# Patient Record
Sex: FEMALE | Race: WHITE | Employment: OTHER | URBAN - METROPOLITAN AREA
[De-identification: names, ages, dates, MRNs, and addresses within clinical notes are randomized per-mention and may not be internally consistent; named-entity substitution may affect disease eponyms.]

---

## 2017-05-10 ENCOUNTER — OFFICE VISIT (OUTPATIENT)
Dept: RHEUMATOLOGY | Age: 82
End: 2017-05-10

## 2017-05-10 VITALS
HEART RATE: 68 BPM | RESPIRATION RATE: 16 BRPM | TEMPERATURE: 97.8 F | SYSTOLIC BLOOD PRESSURE: 137 MMHG | DIASTOLIC BLOOD PRESSURE: 71 MMHG | OXYGEN SATURATION: 94 %

## 2017-05-10 DIAGNOSIS — E07.9 THYROID DISEASE: ICD-10-CM

## 2017-05-10 DIAGNOSIS — M05.79 SEROPOSITIVE RHEUMATOID ARTHRITIS OF MULTIPLE SITES (HCC): Primary | ICD-10-CM

## 2017-05-10 DIAGNOSIS — M19.90 OSTEOARTH NOS-UNSPEC: ICD-10-CM

## 2017-05-10 RX ORDER — ALPRAZOLAM 0.25 MG/1
TABLET ORAL
COMMUNITY

## 2017-05-10 RX ORDER — TRAMADOL HYDROCHLORIDE AND ACETAMINOPHEN 37.5; 325 MG/1; MG/1
TABLET ORAL
COMMUNITY

## 2017-05-10 RX ORDER — SIMVASTATIN 10 MG/1
TABLET, FILM COATED ORAL
COMMUNITY
End: 2020-01-01

## 2017-05-10 RX ORDER — METOPROLOL TARTRATE 25 MG/1
TABLET, FILM COATED ORAL 2 TIMES DAILY
COMMUNITY

## 2017-05-10 RX ORDER — LEVOTHYROXINE SODIUM 125 UG/1
TABLET ORAL
COMMUNITY
End: 2021-01-01

## 2017-05-10 RX ORDER — WARFARIN SODIUM 5 MG/1
5 TABLET ORAL DAILY
COMMUNITY

## 2017-05-10 RX ORDER — METHOTREXATE 2.5 MG/1
TABLET ORAL
COMMUNITY

## 2017-05-10 RX ORDER — AMLODIPINE BESYLATE 10 MG/1
TABLET ORAL DAILY
COMMUNITY

## 2017-05-10 RX ORDER — ACETAMINOPHEN 500 MG
TABLET ORAL
COMMUNITY

## 2017-05-10 RX ORDER — LOSARTAN POTASSIUM 25 MG/1
TABLET ORAL DAILY
COMMUNITY

## 2017-05-10 RX ORDER — PANTOPRAZOLE SODIUM 40 MG/1
40 TABLET, DELAYED RELEASE ORAL DAILY
COMMUNITY

## 2017-05-10 RX ORDER — CHOLECALCIFEROL (VITAMIN D3) 125 MCG
CAPSULE ORAL
COMMUNITY

## 2017-05-10 RX ORDER — TRAMADOL HYDROCHLORIDE 50 MG/1
50 TABLET ORAL
COMMUNITY

## 2017-05-10 RX ORDER — CITALOPRAM 20 MG/1
TABLET, FILM COATED ORAL DAILY
COMMUNITY
End: 2021-01-01

## 2017-05-10 NOTE — PROGRESS NOTES
CHIEF COMPLAINT  The patient was sent for rheumatology consultation by Dr. Pam Tam MD for evaluation of joint pain. HISTORY OF PRESENT ILLNESS  This is a 80 y.o.  female. Today, the patient complains of pain in the joints. Location: knee, spine  Severity:  5 on a scale of 0-10  Timing: daily  Duration:  many years  Modifying factors: Tramadol  Context/Associated signs and symptoms: The patient states that she was diagnosed with RA about 15-20 years ago. When does not remember her symptoms when initially diagnosed. She states that she has been on methotrexate for many years and is currently taking 15 mg weekly. She admits to missing it on occasion, but never more than one week at a time. She denies taking any other treatments for her RA. She states that now she has morning stiffness in her knees and back that lasts for less than an hour. However, she notes that her back pain and stiffness improves with movement throughout the day. She denies any persistent joint swelling and she denies any morning stiffness in her hands, but she complains of triggering in her right 4th finger on occasion. She admits to taking tramadol in the mornings and states that her pain improves. She reports having steroid injections in her back in the past. Her son reports that she frequently gets UTIs. Her son reports that she has fallen while on tramadol and states that she is doing physical therapy now.      RHEUMATOLOGY REVIEW OF SYSTEMS   Positives as per HPI  Negatives as follows:  Merry Small:  Denies unexplained persistent fevers, weight change, chronic fatigue  HEAD/EYES:   Denies eye redness, blurry vision or sudden loss of vision, dry eyes, HA, temporal artery pain  ENT:    Denies oral/nasal ulcers, recurrent sinus infections, dry mouth  RESPIRATORY:  No pleuritic pain, history of pleural effusions, hemoptysis, exertional dyspnea  CARDIOVASCULAR:  Denies chest pain, history of pericardial effusions  GASTRO: Denies heartburn, esophageal dysmotility, abdominal pain, nausea, vomiting, diarrhea, blood in the stool  HEMATOLOGIC:  No easy bruising, purpura, swollen lymph nodes  SKIN:    Denies alopecia, ulcers, nodules, sun sensitivity, unexplained persistent rash   VASCULAR:   Denies edema, cyanosis, raynaud phenomenon  NEUROLOGIC:  Denies specific muscle weakness, paresthesias   PSYCHIATRIC:  No sleep disturbance / snoring, depression, anxiety  MSK:    No morning stiffness >1 hour, SI joint pain, persistent joint swelling     MEDICAL AND SOCIAL HISTORY  This was reviewed with the patient and reviewed in the medical records. Past Medical History:   Diagnosis Date    Arthritis     Diverticulitis     Hx: UTI (urinary tract infection)      Past Surgical History:   Procedure Laterality Date    HX ORTHOPAEDIC      left hip fracture     Social History   Substance Use Topics    Smoking status: Former Smoker    Smokeless tobacco: None    Alcohol use No     Employment - Retired  Sleep - Good, no issues  Exercise - no    FAMILY HISTORY  rheumatoid arthritis - father    MEDICATIONS  All the current medications were reviewed in detail. PHYSICAL EXAM  Blood pressure 137/71, pulse 68, temperature 97.8 °F (36.6 °C), temperature source Oral, resp. rate 16, SpO2 94 %. GENERAL APPEARANCE: Well-nourished adult in no acute distress. EYES: No scleral erythema, conjunctival injection. ENT: No oral ulcer, parotid enlargement. NECK: No adenopathy, thyroid enlargement. CARDIOVASCULAR: Heart rhythm is regular. No murmur, rub, gallop. CHEST: Normal vesicular breath sounds. No wheezes, rales, pleural friction rubs. ABDOMINAL: The abdomen is soft and nontender. Liver and spleen are nonpalpable. Bowel sounds are normal.  EXTREMITIES: There is no evidence of clubbing, cyanosis, edema. SKIN: No rash, palpable purpura, digital ulcer, abnormal thickening.   NEUROLOGICAL: Normal gait and station, full strength in upper and lower extremities, normal sensation to light touch. MUSCULOSKELETAL: Muscle spasms of lower back  Upper extremities - Right 2nd and 3rd MCP synovial thickening, OA changes  Lower extremities - full range of motion, no tenderness, no swelling, no synovial thickening. bony prominence of bilateral knees     LABS, RADIOLOGY AND PROCEDURES  Previous labs reviewed -Yes  Previous radiology reviewed -Yes  Previous procedures reviewed -Yes  Previous medical records reviewed/summarized -Yes    ASSESSMENT  1. RA - diagnosed around her early 65's. Treated with methotrexate. Synovial thickening of MCPs. (New problem - Stable disease) - The patient has a history of RA that was diagnose 15-20 years ago. She was unable to remember her symptoms at the time she was initially diagnosed. She has been on methotrexate for many years and she appears to be stable on this treatment as her symptoms today sound more consistent with OA. She had synovial thickening of her right 2nd and 3rd MCPs, so I suspect that she previously had inflammatory symptoms in her hands. I explained that since she has bene stable on methotrexate for many years, she may be able to taper her methotrexate. However, I will check labs and x-rays today to look for additional evidence of RA before altering her treatment. I will check an x-rays of her SI joints as well since she reported that her back pain improves with movement. She should continue methotrexate 15 mg weekly and return in 3 weeks for a follow up. 2. Osteoarthritis - The patient's knee and back pain is likely from osteoarthritis. I will check x-rays of her hands, feet, knees, and lumbar spine to look for degenerative changes as a cause of this pain. PLAN  1. X-rays of hands, feet, knees to look for inflammatory/erosive changes    2. Check CBC, CMP, markers of inflammation (ESR, CRP), TSH, CHAPARRO , HLA-B27  3. R/O inflammatory cause of joint pain (RA) with a RF and CCP  4.  Continue methotrexate 15 mg weekly  5. Sacroiliac x-ray to look for inflammation/sclerosis/erosive changes  6. Check x-ray of lumbar spine  7. Return in 3 weeks    Govind Fuentes MD  Adult and Pediatric Rheumatology     [facility], [address] [place] [address], Phone [phone], Fax [phone]     Visiting  of Pediatrics    Department of Pediatrics, [facility] of [address] [place] [address], [address] [place] [address], Phone [phone], Fax [phone]    There are no Patient Instructions on file for this visit. cc:  Rosalba Linton MD    Written by marcela Brand, as dictated by Anny Burrows.  Starla Fuentes M.D.

## 2017-05-10 NOTE — MR AVS SNAPSHOT
Visit Information Date & Time Provider Department Dept. Phone Encounter #  
 5/10/2017  4:00 PM Danae Pearson MD [facility] [id number] Follow-up Instructions Return in about 3 weeks (around 5/31/2017). Upcoming Health Maintenance Date Due DTaP/Tdap/Td series (1 - Tdap) 11/8/1958 ZOSTER VACCINE AGE 60> 11/8/1997 GLAUCOMA SCREENING Q2Y 11/8/2002 OSTEOPOROSIS SCREENING (DEXA) 11/8/2002 Pneumococcal 65+ Low/Medium Risk (1 of 2 - PCV13) 11/8/2002 MEDICARE YEARLY EXAM 11/8/2002 INFLUENZA AGE 9 TO ADULT 8/1/2017 Allergies as of 5/10/2017  Review Complete On: 5/10/2017 By: Corby Carbone LPN No Known Allergies Current Immunizations  Never Reviewed No immunizations on file. Not reviewed this visit You Were Diagnosed With   
  
 Codes Comments Seropositive rheumatoid arthritis of multiple sites [facility])    -  Primary ICD-10-CM: M05.79 ICD-9-CM: 714.0 Osteoarth NOS-unspec     ICD-10-CM: M19.90 ICD-9-CM: 715.90 Thyroid disease     ICD-10-CM: E07.9 ICD-9-CM: 246. 9 Vitals BP Pulse Temp Resp SpO2 OB Status 137/71 (BP 1 Location: Right arm, BP Patient Position: Sitting) 68 97.8 °F (36.6 °C) (Oral) 16 94% Postmenopausal  
 Smoking Status Former Smoker Your Updated Medication List  
  
   
This list is accurate as of: 5/10/17  4:50 PM.  Always use your most recent med list.  
  
  
  
  
 acetaminophen 500 mg tablet Commonly known as:  TYLENOL Take  by mouth every six (6) hours as needed for Pain. CALCIUM 600 + D(3) PO Take  by mouth. CeleXA 20 mg tablet Generic drug:  citalopram  
Take  by mouth daily. FIBER CHOICE (INULIN) 1.5 gram Minta Cruise Generic drug:  inulin Take  by mouth. LEVOXYL 125 mcg tablet Generic drug:  levothyroxine Take  by mouth Daily (before breakfast). losartan 25 mg tablet Commonly known as:  COZAAR Take  by mouth daily. methotrexate 2.5 mg tablet Commonly known as:  Hafsa Forge Take  by mouth every Monday. Takes 6 Tablets Every Monday  
  
 metoprolol tartrate 25 mg tablet Commonly known as:  LOPRESSOR Take  by mouth two (2) times a day. NORVASC 10 mg tablet Generic drug:  amLODIPine Take  by mouth daily. PROTONIX 40 mg tablet Generic drug:  pantoprazole Take 40 mg by mouth daily. traMADol 50 mg tablet Commonly known as:  ULTRAM  
Take 50 mg by mouth every six (6) hours as needed for Pain. ULTRACET 37.5-325 mg per tablet Generic drug:  traMADol-acetaminophen Take  by mouth every four (4) hours as needed for Pain. VITAMIN D3 2,000 unit Tab Generic drug:  cholecalciferol (vitamin D3) Take  by mouth.  
  
 warfarin 5 mg tablet Commonly known as:  COUMADIN Take 5 mg by mouth daily. XANAX 0.25 mg tablet Generic drug:  ALPRAZolam  
Take  by mouth. ZOCOR 10 mg tablet Generic drug:  simvastatin Take  by mouth nightly. We Performed the Following ANTINUCLEAR ANTIBODIES, IFA [id number] CPT(R)] C REACTIVE PROTEIN, QT [91966 CPT(R)] CBC+PLATELET+HEM REVIEW [92317 CPT(R)] [address], IGG H9289056 CPT(R)] HLA-B27 [id number] CPT(R)] METABOLIC PANEL, COMPREHENSIVE [24395 CPT(R)] RHEUMATOID FACTOR, QL [id number] CPT(R)] SED RATE (ESR) [id number] CPT(R)] TSH 3RD GENERATION [83656 CPT(R)] Follow-up Instructions Return in about 3 weeks (around 5/31/2017). To-Do List   
 05/10/2017 Imaging:  XR FOOT LT MIN 3 V   
  
 05/10/2017 Imaging:  XR FOOT RT MIN 3 V   
  
 05/10/2017 Imaging:  XR HAND LT MIN 3 V   
  
 05/10/2017 Imaging:  XR HAND RT MIN 3 V   
  
 05/10/2017 Imaging:  XR KNEE LT MAX 2 VWS   
  
 05/10/2017 Imaging:  XR KNEE RT MAX 2 VWS   
  
 05/10/2017 Imaging:  XR SI JTS MIN 3 V   
  
 05/10/2017 Imaging:  XR SPINE LUMB 2 OR 3 V Introducing [facility] & HEALTH SERVICES! Benito Baker introduces [employer] patient portal. Now you can access parts of your medical record, email your doctor's office, and request medication refills online. 1. In your internet browser, go to [email or web address] 2. Click on the First Time User? Click Here link in the Sign In box. You will see the New Member Sign Up page. 3. Enter your [employer] Access Code exactly as it appears below. You will not need to use this code after youve completed the sign-up process. If you do not sign up before the expiration date, you must request a new code. · [employer] Access Code: [email or web address] Expires: 8/8/2017  4:50 PM 
 
4. Enter the last four digits of your Social Security Number (xxxx) and Date of Birth (mm/dd/yyyy) as indicated and click Submit. You will be taken to the next sign-up page. 5. Create a [employer] Dot Payment ID. This will be your [employer] login ID and cannot be changed, so think of one that is secure and easy to remember. 6. Create a [employer] password. You can change your password at any time. 7. Enter your Password Reset Question and Answer. This can be used at a later time if you forget your password. 8. Enter your e-mail address. You will receive e-mail notification when new information is available in [address] 9. Click Sign Up. You can now view and download portions of your medical record. 10. Click the Download Summary menu link to download a portable copy of your medical information. If you have questions, please visit the Frequently Asked Questions section of the [employer] website. Remember, [employer] is NOT to be used for urgent needs. For medical emergencies, dial 911. Now available from your iPhone and Android! Please provide this summary of care documentation to your next provider. Your primary care clinician is listed as [address].  If you have any questions after today's visit, please call [phone].

## 2017-05-17 LAB
ALBUMIN SERPL-MCNC: 4.1 G/DL (ref 3.5–4.8)
ALBUMIN/GLOB SERPL: 1.5 {RATIO} (ref 1.2–2.2)
ALP SERPL-CCNC: 82 IU/L (ref 39–117)
ALT SERPL-CCNC: 8 IU/L (ref 0–32)
ANA TITR SER IF: NEGATIVE {TITER}
AST SERPL-CCNC: 15 IU/L (ref 0–40)
BASOPHILS # BLD MANUAL: 0 X10E3/UL (ref 0–0.2)
BASOPHILS NFR BLD MANUAL: 0 %
BILIRUB SERPL-MCNC: 0.8 MG/DL (ref 0–1.2)
BUN SERPL-MCNC: 11 MG/DL (ref 8–27)
BUN/CREAT SERPL: 15 (ref 12–28)
CALCIUM SERPL-MCNC: 9.7 MG/DL (ref 8.7–10.3)
CCP IGA+IGG SERPL IA-ACNC: 19 UNITS (ref 0–19)
CHLORIDE SERPL-SCNC: 99 MMOL/L (ref 96–106)
CO2 SERPL-SCNC: 26 MMOL/L (ref 18–29)
CREAT SERPL-MCNC: 0.71 MG/DL (ref 0.57–1)
CRP SERPL-MCNC: 13.5 MG/L (ref 0–4.9)
DIFFERENTIAL COMMENT, 115260: ABNORMAL
EOSINOPHIL # BLD MANUAL: 0.1 X10E3/UL (ref 0–0.4)
EOSINOPHIL NFR BLD MANUAL: 1 %
ERYTHROCYTE [DISTWIDTH] IN BLOOD BY AUTOMATED COUNT: 15.2 % (ref 12.3–15.4)
ERYTHROCYTE [SEDIMENTATION RATE] IN BLOOD BY WESTERGREN METHOD: 22 MM/HR (ref 0–40)
GLOBULIN SER CALC-MCNC: 2.7 G/DL (ref 1.5–4.5)
GLUCOSE SERPL-MCNC: 78 MG/DL (ref 65–99)
HCT VFR BLD AUTO: 36.9 % (ref 34–46.6)
HGB BLD-MCNC: 12 G/DL (ref 11.1–15.9)
HLA-B27 QL NAA+PROBE: NEGATIVE
LYMPHOCYTES # BLD MANUAL: 2.2 X10E3/UL (ref 0.7–3.1)
LYMPHOCYTES NFR BLD MANUAL: 36 %
MCH RBC QN AUTO: 32 PG (ref 26.6–33)
MCHC RBC AUTO-ENTMCNC: 32.5 G/DL (ref 31.5–35.7)
MCV RBC AUTO: 98 FL (ref 79–97)
MONOCYTES # BLD MANUAL: 0.4 X10E3/UL (ref 0.1–0.9)
MONOCYTES NFR BLD MANUAL: 7 %
NEUTROPHILS # BLD MANUAL: 3.4 X10E3/UL (ref 1.4–7)
NEUTROPHILS NFR BLD MANUAL: 56 %
PLATELET # BLD AUTO: 257 X10E3/UL (ref 150–379)
PLATELET BLD QL SMEAR: ADEQUATE
POTASSIUM SERPL-SCNC: 3.9 MMOL/L (ref 3.5–5.2)
PROT SERPL-MCNC: 6.8 G/DL (ref 6–8.5)
RBC # BLD AUTO: 3.75 X10E6/UL (ref 3.77–5.28)
RBC MORPH BLD: ABNORMAL
RHEUMATOID FACT SERPL-ACNC: 11 IU/ML (ref 0–13.9)
SODIUM SERPL-SCNC: 142 MMOL/L (ref 134–144)
TSH SERPL DL<=0.005 MIU/L-ACNC: 0.32 UIU/ML (ref 0.45–4.5)
WBC # BLD AUTO: 6 X10E3/UL (ref 3.4–10.8)

## 2017-05-31 ENCOUNTER — OFFICE VISIT (OUTPATIENT)
Dept: RHEUMATOLOGY | Age: 82
End: 2017-05-31

## 2017-05-31 VITALS
DIASTOLIC BLOOD PRESSURE: 90 MMHG | RESPIRATION RATE: 16 BRPM | TEMPERATURE: 97.4 F | SYSTOLIC BLOOD PRESSURE: 147 MMHG | HEART RATE: 87 BPM | OXYGEN SATURATION: 93 %

## 2017-05-31 DIAGNOSIS — M05.742 RHEUMATOID ARTHRITIS INVOLVING BOTH HANDS WITH POSITIVE RHEUMATOID FACTOR (HCC): ICD-10-CM

## 2017-05-31 DIAGNOSIS — M81.8 OTHER OSTEOPOROSIS: ICD-10-CM

## 2017-05-31 DIAGNOSIS — M05.741 RHEUMATOID ARTHRITIS INVOLVING BOTH HANDS WITH POSITIVE RHEUMATOID FACTOR (HCC): ICD-10-CM

## 2017-05-31 DIAGNOSIS — M17.0 PRIMARY OSTEOARTHRITIS OF BOTH KNEES: Primary | ICD-10-CM

## 2017-05-31 RX ORDER — WARFARIN 2 MG/1
TABLET ORAL
COMMUNITY
Start: 2017-05-28

## 2017-05-31 RX ORDER — VALACYCLOVIR HYDROCHLORIDE 1 G/1
TABLET, FILM COATED ORAL
COMMUNITY
Start: 2017-03-08

## 2017-05-31 RX ORDER — DILTIAZEM HYDROCHLORIDE 30 MG/1
TABLET, FILM COATED ORAL
Refills: 1 | COMMUNITY
Start: 2017-05-21

## 2017-05-31 RX ORDER — SIMVASTATIN 40 MG/1
TABLET, FILM COATED ORAL
COMMUNITY
Start: 2017-04-19 | End: 2020-01-01

## 2017-05-31 RX ORDER — METOPROLOL TARTRATE 50 MG/1
TABLET ORAL
Refills: 1 | COMMUNITY
Start: 2017-05-17

## 2017-05-31 NOTE — MR AVS SNAPSHOT
Visit Information Date & Time Provider Department Dept. Phone Encounter #  
 5/31/2017  2:00 PM Cristiana Denis MD Arthritis and [address] [place] [address] [phone] [id number] Follow-up Instructions Return in about 4 months (around 9/30/2017). Upcoming Health Maintenance Date Due DTaP/Tdap/Td series (1 - Tdap) 11/8/1956 ZOSTER VACCINE AGE 60> 11/8/1997 GLAUCOMA SCREENING Q2Y 11/8/2002 OSTEOPOROSIS SCREENING (DEXA) 11/8/2002 Pneumococcal 65+ Low/Medium Risk (1 of 2 - PCV13) 11/8/2002 MEDICARE YEARLY EXAM 11/8/2002 INFLUENZA AGE 9 TO ADULT 8/1/2017 Allergies as of 5/31/2017  Review Complete On: 5/31/2017 By: Marcell Cota LPN No Known Allergies Current Immunizations  Never Reviewed No immunizations on file. Not reviewed this visit Vitals BP Pulse Temp Resp SpO2 OB Status 147/90 (BP 1 Location: Left arm, BP Patient Position: Sitting) 87 97.4 °F (36.3 °C) (Oral) 16 93% Postmenopausal  
 Smoking Status Former Smoker Vitals History Preferred Pharmacy Pharmacy Name Phone [id number] [place] [address] [facility] [phone] Your Updated Medication List  
  
   
This list is accurate as of: 5/31/17  2:59 PM.  Always use your most recent med list.  
  
  
  
  
 acetaminophen 500 mg tablet Commonly known as:  TYLENOL Take  by mouth every six (6) hours as needed for Pain. CALCIUM 600 + D(3) PO Take  by mouth. CeleXA 20 mg tablet Generic drug:  citalopram  
Take  by mouth daily. dilTIAZem 30 mg tablet Commonly known as:  CARDIZEM  
TAKE 1 TABLET BY MOUTH EVERY 12 HOURS FIBER CHOICE (INULIN) 1.5 gram [facility] Generic drug:  inulin Take  by mouth. LEVOXYL 125 mcg tablet Generic drug:  levothyroxine Take  by mouth Daily (before breakfast). losartan 25 mg tablet Commonly known as:  COZAAR Take  by mouth daily. methotrexate 2.5 mg tablet Commonly known as:  Kate Ramsay Take  by mouth every Monday. Takes 6 Tablets Every Monday * metoprolol tartrate 25 mg tablet Commonly known as:  LOPRESSOR Take  by mouth two (2) times a day. * metoprolol tartrate 50 mg tablet Commonly known as:  LOPRESSOR  
TAKE 1/2 TABLETS BY MOUTH TWICE DAILY  
  
 NORVASC 10 mg tablet Generic drug:  amLODIPine Take  by mouth daily. PROTONIX 40 mg tablet Generic drug:  pantoprazole Take 40 mg by mouth daily. traMADol 50 mg tablet Commonly known as:  ULTRAM  
Take 50 mg by mouth every six (6) hours as needed for Pain. ULTRACET 37.5-325 mg per tablet Generic drug:  traMADol-acetaminophen Take  by mouth every four (4) hours as needed for Pain. valACYclovir 1 gram tablet Commonly known as:  VALTREX  
  
 VITAMIN D3 2,000 unit Tab Generic drug:  cholecalciferol (vitamin D3) Take  by mouth. * warfarin 5 mg tablet Commonly known as:  COUMADIN Take 5 mg by mouth daily. * warfarin 2 mg tablet Commonly known as:  COUMADIN  
  
 XANAX 0.25 mg tablet Generic drug:  ALPRAZolam  
Take  by mouth. * ZOCOR 10 mg tablet Generic drug:  simvastatin Take  by mouth nightly. * simvastatin 40 mg tablet Commonly known as:  ZOCOR * Notice: This list has 6 medication(s) that are the same as other medications prescribed for you. Read the directions carefully, and ask your doctor or other care provider to review them with you. Follow-up Instructions Return in about 4 months (around 9/30/2017). Introducing [facility] & HEALTH SERVICES! Agustina Espinal introduces [employer] [place] patient portal. Now you can access parts of your medical record, email your doctor's office, and request medication refills online. 1. In your internet browser, go to [email or web address] 2. Click on the First Time User? Click Here link in the Sign In box.  You will see the New Member Sign Up page. 3. Enter your LinguaNext Access Code exactly as it appears below. You will not need to use this code after youve completed the sign-up process. If you do not sign up before the expiration date, you must request a new code. · L[employer] Access Code: [email or web address] Expires: 8/8/2017  4:50 PM 
 
4. Enter the last four digits of your Social Security Number (xxxx) and Date of Birth (mm/dd/yyyy) as indicated and click Submit. You will be taken to the next sign-up page. 5. Create a ClearAppt ID. This will be your LinguaNext login ID and cannot be changed, so think of one that is secure and easy to remember. 6. Create a LinguaNext password. You can change your password at any time. 7. Enter your Password Reset Question and Answer. This can be used at a later time if you forget your password. 8. Enter your e-mail address. You will receive e-mail notification when new information is available in [address] 9. Click Sign Up. You can now view and download portions of your medical record. 10. Click the Download Summary menu link to download a portable copy of your medical information. If you have questions, please visit the Frequently Asked Questions section of the Lingu[employer]Next website. Remember, L[employer]Next is NOT to be used for urgent needs. For medical emergencies, dial 911. Now available from your iPhone and Android! Please provide this summary of care documentation to your next provider. Your primary care clinician is listed as [address] [facility]. If you have any questions after today's visit, please call [phone].

## 2017-05-31 NOTE — PROGRESS NOTES
RHEUMATOLOGY PROBLEM LIST AND CHIEF COMPLAINT  1. RA - diagnosed around her early 63's. Treated with methotrexate. Synovial thickening of MCPs. Negative CHAPARRO, RF, CCP, HLA-B27.  2. OA - hands, feet, knees    INTERVAL HISTORY  Ms. Lydia Shone is a 80 y.o.  female who returns for follow up. We discussed the study results in detail. *Note: the patient had trouble answering questions. The patient admits to taking Reclast for osteoporosis as well as calcium and vitamin D. She reports to having a bone density checked in December 2016. PHYSICAL EXAM  Patient not fully examined; the patient is here to review lab studies, radiologic studies and discuss management and treatment. LABS, RADIOLOGY AND PROCEDURES - Previous available labs, radiology and procedures were reviewed in detail with the patient. The patient was counseled on the labs that were ordered and the meaning of positive and negative results and any disease implication that these labs may have. 5/10/2017 Labs  CBC, CMP, ESR - normal  CRP(13.5) - elevated  TSH(0.321) - low  CHAPARRO IF - negative  RF, CCP - negative  HLA-B27 - negative    5/10/2017 Lumbar spine x-ray  IMPRESSION:  1. Severe osteopenia with chronic fractures of L1 and L3. No acute fracture  2. Multilevel facet degenerative change    5/10/2017 SI joint x-ray  IMPRESSION:   Severe osteopenia. No evidence of acute or chronic sacroiliitis     5/10/2017 Right knee x-ray  IMPRESSION:   1. Tricompartmental DJD most severe laterally. 2. Chondrocalcinosis of the lateral meniscus     5/10/2017 Left knee x-ray  IMPRESSION:   Mild tricompartmental DJD and osteopenia. 5/10/2017 Right hand x-ray  IMPRESSION:   Scattered DJD most significant at the first [facility] and triscaphe joints. Osteopenia  and chondrocalcinosis. 5/10/2017 Left hand x-ray  IMPRESSION:   1. Osteopenia and DJD  2. Chondrocalcinosis of the triangular fibrocartilage.     5/10/2017 Right foot x-ray  IMPRESSION: 1. Osteopenia and mild degeneration of the first MTP joint. No evidence of  inflammatory or crystalline arthropathy.     5/10/2017 Left foot x-ray  IMPRESSION:   Postsurgical change first metatarsal and proximal phalanx of the great toe with  mild degeneration of the first MTP joint. Osteopenia. No erosive change.     ASSESSMENT  1. RA - (Established problem -  Stable disease) - The patient's labs and imaging studies did not show signs of active inflammation. I suspect that her RA is stable and controlled on methotrexate. At this time I do not think we need to escalate treatment, so I will continue her on methotrexate 15 mg weekly. She should return in 4 months for a follow up. 2. Osteoarthritis - The patient's imaging studies showed significant osteoarthritis in the hands, feet, knees, and spine. I have recommended she continue OTC medications for pain, however the patient was unable to answer questions without her daughter's help, so I do not recommend that she continue taking tramadol. PLAN  1. Methotrexate 15 mg weekly  2. Obtain records on osteoporosis  3. I do not recommend that the patient continue taking tramamdol  4. Return in 4 months    Total face-to face time was 40 minutes, greater than 50% of which was spent in counseling and coordination of care. The diagnosis, treatment and various other items were discussed in detail: Test results, medication options, possible side effects, lifestyle changes. Govind Pickens MD  Adult and Pediatric Rheumatology     [facility] [address], Phone [phone], Fax [phone]     Visiting  of Pediatrics    Department of Pediatrics, [facility] of [address] [place] [address], Phone [phone], Fax [phone]    There are no Patient Instructions on file for this visit.     cc:  Walterine Spatz, MD    Written by Regan Lobo marcela Rose, as dictated by Ryan Valverde.  Ángel Brandt M.D.

## 2017-10-24 LAB — MAMMOGRAPHY, EXTERNAL: NORMAL

## 2018-03-21 ENCOUNTER — HOSPITAL ENCOUNTER (OUTPATIENT)
Age: 83
Discharge: HOME | End: 2018-04-04
Attending: PHYSICAL MEDICINE & REHABILITATION | Admitting: PHYSICAL MEDICINE & REHABILITATION

## 2018-03-21 LAB
APPEARANCE UR: ABNORMAL
BACTERIA URNS QL MICRO: NEGATIVE /HPF
BILIRUB UR QL: NEGATIVE
CAOX CRY URNS QL MICRO: ABNORMAL
COLOR UR: ABNORMAL
EPITH CASTS URNS QL MICRO: ABNORMAL /LPF
GLUCOSE UR STRIP.AUTO-MCNC: NEGATIVE MG/DL
HGB UR QL STRIP: ABNORMAL
HYALINE CASTS URNS QL MICRO: ABNORMAL /LPF (ref 0–5)
KETONES UR QL STRIP.AUTO: NEGATIVE MG/DL
LEUKOCYTE ESTERASE UR QL STRIP.AUTO: ABNORMAL
NITRITE UR QL STRIP.AUTO: NEGATIVE
PH UR STRIP: 6 [PH] (ref 5–8)
PROT UR STRIP-MCNC: ABNORMAL MG/DL
RBC #/AREA URNS HPF: ABNORMAL /HPF (ref 0–5)
SP GR UR REFRACTOMETRY: 1.03 (ref 1–1.03)
UROBILINOGEN UR QL STRIP.AUTO: 0.2 EU/DL (ref 0.2–1)
WBC URNS QL MICRO: ABNORMAL /HPF (ref 0–4)

## 2018-03-21 PROCEDURE — 74011250637 HC RX REV CODE- 250/637: Performed by: PHYSICAL MEDICINE & REHABILITATION

## 2018-03-21 PROCEDURE — 81001 URINALYSIS AUTO W/SCOPE: CPT | Performed by: PHYSICAL MEDICINE & REHABILITATION

## 2018-03-21 PROCEDURE — 87077 CULTURE AEROBIC IDENTIFY: CPT | Performed by: PHYSICAL MEDICINE & REHABILITATION

## 2018-03-21 PROCEDURE — 87186 SC STD MICRODIL/AGAR DIL: CPT | Performed by: PHYSICAL MEDICINE & REHABILITATION

## 2018-03-21 PROCEDURE — 87086 URINE CULTURE/COLONY COUNT: CPT | Performed by: PHYSICAL MEDICINE & REHABILITATION

## 2018-03-21 RX ORDER — QUETIAPINE FUMARATE 25 MG/1
25 TABLET, FILM COATED ORAL 2 TIMES DAILY
Status: DISCONTINUED | OUTPATIENT
Start: 2018-03-21 | End: 2018-04-04 | Stop reason: HOSPADM

## 2018-03-21 RX ORDER — METOPROLOL TARTRATE 50 MG/1
50 TABLET ORAL EVERY 8 HOURS
Status: DISCONTINUED | OUTPATIENT
Start: 2018-03-21 | End: 2018-04-04 | Stop reason: HOSPADM

## 2018-03-21 RX ORDER — DOCUSATE SODIUM 100 MG/1
100 CAPSULE, LIQUID FILLED ORAL DAILY
Status: DISCONTINUED | OUTPATIENT
Start: 2018-03-22 | End: 2018-03-28

## 2018-03-21 RX ORDER — ACETAMINOPHEN 325 MG/1
650 TABLET ORAL
Status: DISCONTINUED | OUTPATIENT
Start: 2018-03-21 | End: 2018-04-04 | Stop reason: HOSPADM

## 2018-03-21 RX ORDER — FACIAL-BODY WIPES
10 EACH TOPICAL DAILY PRN
Status: DISCONTINUED | OUTPATIENT
Start: 2018-03-21 | End: 2018-04-04 | Stop reason: HOSPADM

## 2018-03-21 RX ORDER — DONEPEZIL HYDROCHLORIDE 5 MG/1
5 TABLET, FILM COATED ORAL DAILY
Status: DISCONTINUED | OUTPATIENT
Start: 2018-03-22 | End: 2018-04-04 | Stop reason: HOSPADM

## 2018-03-21 RX ORDER — SIMVASTATIN 20 MG/1
40 TABLET, FILM COATED ORAL
Status: DISCONTINUED | OUTPATIENT
Start: 2018-03-21 | End: 2018-04-04 | Stop reason: HOSPADM

## 2018-03-21 RX ORDER — DILTIAZEM HYDROCHLORIDE 30 MG/1
60 TABLET, FILM COATED ORAL 2 TIMES DAILY
Status: DISCONTINUED | OUTPATIENT
Start: 2018-03-21 | End: 2018-04-04 | Stop reason: HOSPADM

## 2018-03-21 RX ORDER — PANTOPRAZOLE SODIUM 40 MG/1
40 TABLET, DELAYED RELEASE ORAL
Status: DISCONTINUED | OUTPATIENT
Start: 2018-03-21 | End: 2018-03-23

## 2018-03-21 RX ORDER — ONDANSETRON 4 MG/1
4 TABLET, ORALLY DISINTEGRATING ORAL
Status: DISCONTINUED | OUTPATIENT
Start: 2018-03-21 | End: 2018-04-04 | Stop reason: HOSPADM

## 2018-03-21 RX ORDER — FOLIC ACID 1 MG/1
1 TABLET ORAL DAILY
Status: DISCONTINUED | OUTPATIENT
Start: 2018-03-22 | End: 2018-04-04 | Stop reason: HOSPADM

## 2018-03-21 RX ORDER — ADHESIVE BANDAGE
30 BANDAGE TOPICAL DAILY PRN
Status: DISCONTINUED | OUTPATIENT
Start: 2018-03-21 | End: 2018-04-04 | Stop reason: HOSPADM

## 2018-03-21 RX ORDER — QUETIAPINE FUMARATE 25 MG/1
12.5 TABLET, FILM COATED ORAL
Status: DISCONTINUED | OUTPATIENT
Start: 2018-03-21 | End: 2018-04-04 | Stop reason: HOSPADM

## 2018-03-21 RX ORDER — FERROUS SULFATE, DRIED 160(50) MG
1 TABLET, EXTENDED RELEASE ORAL
Status: DISCONTINUED | OUTPATIENT
Start: 2018-03-22 | End: 2018-04-04 | Stop reason: HOSPADM

## 2018-03-21 RX ORDER — DULOXETIN HYDROCHLORIDE 30 MG/1
60 CAPSULE, DELAYED RELEASE ORAL DAILY
Status: DISCONTINUED | OUTPATIENT
Start: 2018-03-22 | End: 2018-04-04 | Stop reason: HOSPADM

## 2018-03-21 RX ORDER — AMOXICILLIN 250 MG
1 CAPSULE ORAL DAILY
Status: DISCONTINUED | OUTPATIENT
Start: 2018-03-22 | End: 2018-03-28

## 2018-03-21 RX ADMIN — DILTIAZEM HYDROCHLORIDE 60 MG: 30 TABLET, FILM COATED ORAL at 21:24

## 2018-03-21 RX ADMIN — QUETIAPINE FUMARATE 12.5 MG: 25 TABLET ORAL at 21:25

## 2018-03-21 RX ADMIN — PANTOPRAZOLE SODIUM 40 MG: 40 TABLET, DELAYED RELEASE ORAL at 17:58

## 2018-03-21 RX ADMIN — METOPROLOL TARTRATE 50 MG: 50 TABLET ORAL at 21:26

## 2018-03-21 RX ADMIN — SIMVASTATIN 40 MG: 20 TABLET, FILM COATED ORAL at 21:26

## 2018-03-21 RX ADMIN — QUETIAPINE FUMARATE 25 MG: 25 TABLET ORAL at 21:00

## 2018-03-21 RX ADMIN — VANCOMYCIN HYDROCHLORIDE 125 MG: 1 INJECTION, POWDER, LYOPHILIZED, FOR SOLUTION INTRAVENOUS at 17:58

## 2018-03-22 LAB
25(OH)D3 SERPL-MCNC: 38.5 NG/ML (ref 30–100)
ALBUMIN SERPL-MCNC: 2.6 G/DL (ref 3.5–5)
ALBUMIN/GLOB SERPL: 0.6 {RATIO} (ref 1.1–2.2)
ALP SERPL-CCNC: 64 U/L (ref 45–117)
ALT SERPL-CCNC: 15 U/L (ref 12–78)
ANION GAP SERPL CALC-SCNC: 15 MMOL/L (ref 5–15)
AST SERPL-CCNC: 13 U/L (ref 15–37)
BILIRUB SERPL-MCNC: 0.3 MG/DL (ref 0.2–1)
BUN SERPL-MCNC: 10 MG/DL (ref 6–20)
BUN/CREAT SERPL: 14 (ref 12–20)
CALCIUM SERPL-MCNC: 9 MG/DL (ref 8.5–10.1)
CHLORIDE SERPL-SCNC: 104 MMOL/L (ref 97–108)
CO2 SERPL-SCNC: 22 MMOL/L (ref 21–32)
CREAT SERPL-MCNC: 0.7 MG/DL (ref 0.55–1.02)
ERYTHROCYTE [DISTWIDTH] IN BLOOD BY AUTOMATED COUNT: 14.5 % (ref 11.5–14.5)
GLOBULIN SER CALC-MCNC: 4.2 G/DL (ref 2–4)
GLUCOSE SERPL-MCNC: 117 MG/DL (ref 65–100)
HCT VFR BLD AUTO: 32.4 % (ref 35–47)
HGB BLD-MCNC: 10.4 G/DL (ref 11.5–16)
MCH RBC QN AUTO: 31.9 PG (ref 26–34)
MCHC RBC AUTO-ENTMCNC: 32.1 G/DL (ref 30–36.5)
MCV RBC AUTO: 99.4 FL (ref 80–99)
NRBC # BLD: 0 K/UL (ref 0–0.01)
NRBC BLD-RTO: 0 PER 100 WBC
PLATELET # BLD AUTO: 314 K/UL (ref 150–400)
PMV BLD AUTO: 10.1 FL (ref 8.9–12.9)
POTASSIUM SERPL-SCNC: 3.4 MMOL/L (ref 3.5–5.1)
PROT SERPL-MCNC: 6.8 G/DL (ref 6.4–8.2)
RBC # BLD AUTO: 3.26 M/UL (ref 3.8–5.2)
SODIUM SERPL-SCNC: 141 MMOL/L (ref 136–145)
WBC # BLD AUTO: 8.8 K/UL (ref 3.6–11)

## 2018-03-22 PROCEDURE — 36415 COLL VENOUS BLD VENIPUNCTURE: CPT | Performed by: PHYSICAL MEDICINE & REHABILITATION

## 2018-03-22 PROCEDURE — 82306 VITAMIN D 25 HYDROXY: CPT | Performed by: PHYSICAL MEDICINE & REHABILITATION

## 2018-03-22 PROCEDURE — 85027 COMPLETE CBC AUTOMATED: CPT | Performed by: PHYSICAL MEDICINE & REHABILITATION

## 2018-03-22 PROCEDURE — 80053 COMPREHEN METABOLIC PANEL: CPT | Performed by: PHYSICAL MEDICINE & REHABILITATION

## 2018-03-22 PROCEDURE — 74011250637 HC RX REV CODE- 250/637: Performed by: PHYSICAL MEDICINE & REHABILITATION

## 2018-03-22 RX ORDER — POTASSIUM CHLORIDE 750 MG/1
40 TABLET, FILM COATED, EXTENDED RELEASE ORAL
Status: COMPLETED | OUTPATIENT
Start: 2018-03-22 | End: 2018-03-22

## 2018-03-22 RX ORDER — NYSTATIN 100000 [USP'U]/G
POWDER TOPICAL 2 TIMES DAILY
Status: DISCONTINUED | OUTPATIENT
Start: 2018-03-22 | End: 2018-04-04 | Stop reason: HOSPADM

## 2018-03-22 RX ADMIN — SIMVASTATIN 40 MG: 20 TABLET, FILM COATED ORAL at 21:32

## 2018-03-22 RX ADMIN — PANTOPRAZOLE SODIUM 40 MG: 40 TABLET, DELAYED RELEASE ORAL at 15:51

## 2018-03-22 RX ADMIN — ACETAMINOPHEN 650 MG: 325 TABLET ORAL at 21:32

## 2018-03-22 RX ADMIN — QUETIAPINE FUMARATE 25 MG: 25 TABLET ORAL at 21:33

## 2018-03-22 RX ADMIN — DILTIAZEM HYDROCHLORIDE 60 MG: 30 TABLET, FILM COATED ORAL at 08:43

## 2018-03-22 RX ADMIN — VANCOMYCIN HYDROCHLORIDE 125 MG: 1 INJECTION, POWDER, LYOPHILIZED, FOR SOLUTION INTRAVENOUS at 05:34

## 2018-03-22 RX ADMIN — NYSTATIN: 100000 POWDER TOPICAL at 21:34

## 2018-03-22 RX ADMIN — METOPROLOL TARTRATE 50 MG: 50 TABLET ORAL at 05:34

## 2018-03-22 RX ADMIN — VANCOMYCIN HYDROCHLORIDE 125 MG: 1 INJECTION, POWDER, LYOPHILIZED, FOR SOLUTION INTRAVENOUS at 01:00

## 2018-03-22 RX ADMIN — DULOXETINE HYDROCHLORIDE 60 MG: 30 CAPSULE, DELAYED RELEASE ORAL at 08:44

## 2018-03-22 RX ADMIN — ACETAMINOPHEN 650 MG: 325 TABLET ORAL at 08:43

## 2018-03-22 RX ADMIN — ACETAMINOPHEN 650 MG: 325 TABLET ORAL at 11:56

## 2018-03-22 RX ADMIN — VANCOMYCIN HYDROCHLORIDE 125 MG: 1 INJECTION, POWDER, LYOPHILIZED, FOR SOLUTION INTRAVENOUS at 23:54

## 2018-03-22 RX ADMIN — DILTIAZEM HYDROCHLORIDE 60 MG: 30 TABLET, FILM COATED ORAL at 21:32

## 2018-03-22 RX ADMIN — METOPROLOL TARTRATE 50 MG: 50 TABLET ORAL at 21:33

## 2018-03-22 RX ADMIN — NYSTATIN: 100000 POWDER TOPICAL at 11:56

## 2018-03-22 RX ADMIN — VANCOMYCIN HYDROCHLORIDE 125 MG: 1 INJECTION, POWDER, LYOPHILIZED, FOR SOLUTION INTRAVENOUS at 11:56

## 2018-03-22 RX ADMIN — FOLIC ACID 1 MG: 1 TABLET ORAL at 08:43

## 2018-03-22 RX ADMIN — VANCOMYCIN HYDROCHLORIDE 125 MG: 1 INJECTION, POWDER, LYOPHILIZED, FOR SOLUTION INTRAVENOUS at 17:16

## 2018-03-22 RX ADMIN — QUETIAPINE FUMARATE 25 MG: 25 TABLET ORAL at 08:43

## 2018-03-22 RX ADMIN — RIVAROXABAN 20 MG: 10 TABLET, FILM COATED ORAL at 08:43

## 2018-03-22 RX ADMIN — OYSTER SHELL CALCIUM WITH VITAMIN D 1 TABLET: 500; 200 TABLET, FILM COATED ORAL at 08:44

## 2018-03-22 RX ADMIN — POTASSIUM CHLORIDE 40 MEQ: 750 TABLET, EXTENDED RELEASE ORAL at 13:53

## 2018-03-22 RX ADMIN — LEVOTHYROXINE SODIUM 125 MCG: 75 TABLET ORAL at 05:34

## 2018-03-22 RX ADMIN — PANTOPRAZOLE SODIUM 40 MG: 40 TABLET, DELAYED RELEASE ORAL at 05:34

## 2018-03-22 RX ADMIN — QUETIAPINE FUMARATE 12.5 MG: 25 TABLET ORAL at 21:34

## 2018-03-23 LAB
BACTERIA SPEC CULT: ABNORMAL
BACTERIA SPEC CULT: ABNORMAL
CC UR VC: ABNORMAL
SERVICE CMNT-IMP: ABNORMAL

## 2018-03-23 PROCEDURE — 74011250637 HC RX REV CODE- 250/637: Performed by: PHYSICAL MEDICINE & REHABILITATION

## 2018-03-23 RX ORDER — FAMOTIDINE 20 MG/1
20 TABLET, FILM COATED ORAL 2 TIMES DAILY
Status: DISCONTINUED | OUTPATIENT
Start: 2018-03-23 | End: 2018-03-29

## 2018-03-23 RX ORDER — THERA TABS 400 MCG
1 TAB ORAL DAILY
Status: DISCONTINUED | OUTPATIENT
Start: 2018-03-23 | End: 2018-04-04 | Stop reason: HOSPADM

## 2018-03-23 RX ADMIN — PANTOPRAZOLE SODIUM 40 MG: 40 TABLET, DELAYED RELEASE ORAL at 05:30

## 2018-03-23 RX ADMIN — VANCOMYCIN HYDROCHLORIDE 125 MG: 1 INJECTION, POWDER, LYOPHILIZED, FOR SOLUTION INTRAVENOUS at 05:26

## 2018-03-23 RX ADMIN — VANCOMYCIN HYDROCHLORIDE 125 MG: 1 INJECTION, POWDER, LYOPHILIZED, FOR SOLUTION INTRAVENOUS at 12:57

## 2018-03-23 RX ADMIN — SIMVASTATIN 40 MG: 20 TABLET, FILM COATED ORAL at 21:28

## 2018-03-23 RX ADMIN — DILTIAZEM HYDROCHLORIDE 60 MG: 30 TABLET, FILM COATED ORAL at 09:30

## 2018-03-23 RX ADMIN — QUETIAPINE FUMARATE 25 MG: 25 TABLET ORAL at 21:28

## 2018-03-23 RX ADMIN — QUETIAPINE FUMARATE 12.5 MG: 25 TABLET ORAL at 21:28

## 2018-03-23 RX ADMIN — FOLIC ACID 1 MG: 1 TABLET ORAL at 09:30

## 2018-03-23 RX ADMIN — DILTIAZEM HYDROCHLORIDE 60 MG: 30 TABLET, FILM COATED ORAL at 21:27

## 2018-03-23 RX ADMIN — OYSTER SHELL CALCIUM WITH VITAMIN D 1 TABLET: 500; 200 TABLET, FILM COATED ORAL at 09:29

## 2018-03-23 RX ADMIN — DONEPEZIL HYDROCHLORIDE 5 MG: 5 TABLET, FILM COATED ORAL at 09:29

## 2018-03-23 RX ADMIN — QUETIAPINE FUMARATE 25 MG: 25 TABLET ORAL at 09:30

## 2018-03-23 RX ADMIN — METOPROLOL TARTRATE 50 MG: 50 TABLET ORAL at 05:27

## 2018-03-23 RX ADMIN — FAMOTIDINE 20 MG: 20 TABLET, FILM COATED ORAL at 21:27

## 2018-03-23 RX ADMIN — METOPROLOL TARTRATE 50 MG: 50 TABLET ORAL at 13:01

## 2018-03-23 RX ADMIN — NYSTATIN: 100000 POWDER TOPICAL at 09:33

## 2018-03-23 RX ADMIN — RIVAROXABAN 20 MG: 10 TABLET, FILM COATED ORAL at 09:29

## 2018-03-23 RX ADMIN — NYSTATIN: 100000 POWDER TOPICAL at 21:29

## 2018-03-23 RX ADMIN — ACETAMINOPHEN 650 MG: 325 TABLET ORAL at 09:28

## 2018-03-23 RX ADMIN — THERA TABS 1 TABLET: TAB at 17:21

## 2018-03-23 RX ADMIN — ACETAMINOPHEN 650 MG: 325 TABLET ORAL at 05:27

## 2018-03-23 RX ADMIN — METOPROLOL TARTRATE 50 MG: 50 TABLET ORAL at 21:28

## 2018-03-23 RX ADMIN — LEVOTHYROXINE SODIUM 125 MCG: 75 TABLET ORAL at 06:30

## 2018-03-23 RX ADMIN — DULOXETINE HYDROCHLORIDE 60 MG: 30 CAPSULE, DELAYED RELEASE ORAL at 09:30

## 2018-03-23 RX ADMIN — VANCOMYCIN HYDROCHLORIDE 125 MG: 1 INJECTION, POWDER, LYOPHILIZED, FOR SOLUTION INTRAVENOUS at 17:21

## 2018-03-23 RX ADMIN — VANCOMYCIN HYDROCHLORIDE 125 MG: 1 INJECTION, POWDER, LYOPHILIZED, FOR SOLUTION INTRAVENOUS at 23:20

## 2018-03-24 LAB
ANION GAP SERPL CALC-SCNC: 8 MMOL/L (ref 5–15)
APPEARANCE UR: CLEAR
BACTERIA URNS QL MICRO: NEGATIVE /HPF
BILIRUB UR QL: NEGATIVE
BUN SERPL-MCNC: 7 MG/DL (ref 6–20)
BUN/CREAT SERPL: 10 (ref 12–20)
CALCIUM SERPL-MCNC: 8.8 MG/DL (ref 8.5–10.1)
CAOX CRY URNS QL MICRO: ABNORMAL
CHLORIDE SERPL-SCNC: 108 MMOL/L (ref 97–108)
CO2 SERPL-SCNC: 26 MMOL/L (ref 21–32)
COLOR UR: ABNORMAL
CREAT SERPL-MCNC: 0.69 MG/DL (ref 0.55–1.02)
EPITH CASTS URNS QL MICRO: ABNORMAL /LPF
GLUCOSE SERPL-MCNC: 113 MG/DL (ref 65–100)
GLUCOSE UR STRIP.AUTO-MCNC: NEGATIVE MG/DL
HGB UR QL STRIP: ABNORMAL
KETONES UR QL STRIP.AUTO: NEGATIVE MG/DL
LEUKOCYTE ESTERASE UR QL STRIP.AUTO: ABNORMAL
MUCOUS THREADS URNS QL MICRO: ABNORMAL /LPF
NITRITE UR QL STRIP.AUTO: NEGATIVE
PH UR STRIP: 5.5 [PH] (ref 5–8)
POTASSIUM SERPL-SCNC: 3.7 MMOL/L (ref 3.5–5.1)
PROT UR STRIP-MCNC: NEGATIVE MG/DL
RBC #/AREA URNS HPF: ABNORMAL /HPF (ref 0–5)
SODIUM SERPL-SCNC: 142 MMOL/L (ref 136–145)
SP GR UR REFRACTOMETRY: 1.02 (ref 1–1.03)
UROBILINOGEN UR QL STRIP.AUTO: 0.2 EU/DL (ref 0.2–1)
WBC URNS QL MICRO: ABNORMAL /HPF (ref 0–4)

## 2018-03-24 PROCEDURE — 87077 CULTURE AEROBIC IDENTIFY: CPT | Performed by: PHYSICAL MEDICINE & REHABILITATION

## 2018-03-24 PROCEDURE — 87186 SC STD MICRODIL/AGAR DIL: CPT | Performed by: PHYSICAL MEDICINE & REHABILITATION

## 2018-03-24 PROCEDURE — 80048 BASIC METABOLIC PNL TOTAL CA: CPT | Performed by: PHYSICAL MEDICINE & REHABILITATION

## 2018-03-24 PROCEDURE — 74011250637 HC RX REV CODE- 250/637: Performed by: PHYSICAL MEDICINE & REHABILITATION

## 2018-03-24 PROCEDURE — 81001 URINALYSIS AUTO W/SCOPE: CPT | Performed by: PHYSICAL MEDICINE & REHABILITATION

## 2018-03-24 PROCEDURE — 87086 URINE CULTURE/COLONY COUNT: CPT | Performed by: PHYSICAL MEDICINE & REHABILITATION

## 2018-03-24 PROCEDURE — 36415 COLL VENOUS BLD VENIPUNCTURE: CPT | Performed by: PHYSICAL MEDICINE & REHABILITATION

## 2018-03-24 RX ADMIN — RIVAROXABAN 20 MG: 10 TABLET, FILM COATED ORAL at 08:48

## 2018-03-24 RX ADMIN — FAMOTIDINE 20 MG: 20 TABLET, FILM COATED ORAL at 21:28

## 2018-03-24 RX ADMIN — QUETIAPINE FUMARATE 25 MG: 25 TABLET ORAL at 21:00

## 2018-03-24 RX ADMIN — SENNOSIDES AND DOCUSATE SODIUM 1 TABLET: 8.6; 5 TABLET ORAL at 08:49

## 2018-03-24 RX ADMIN — SIMVASTATIN 40 MG: 20 TABLET, FILM COATED ORAL at 21:28

## 2018-03-24 RX ADMIN — DULOXETINE HYDROCHLORIDE 60 MG: 30 CAPSULE, DELAYED RELEASE ORAL at 08:48

## 2018-03-24 RX ADMIN — METOPROLOL TARTRATE 50 MG: 50 TABLET ORAL at 13:14

## 2018-03-24 RX ADMIN — FAMOTIDINE 20 MG: 20 TABLET, FILM COATED ORAL at 08:49

## 2018-03-24 RX ADMIN — NYSTATIN: 100000 POWDER TOPICAL at 08:47

## 2018-03-24 RX ADMIN — THERA TABS 1 TABLET: TAB at 08:49

## 2018-03-24 RX ADMIN — DOCUSATE SODIUM 100 MG: 100 CAPSULE, LIQUID FILLED ORAL at 08:49

## 2018-03-24 RX ADMIN — VANCOMYCIN HYDROCHLORIDE 125 MG: 1 INJECTION, POWDER, LYOPHILIZED, FOR SOLUTION INTRAVENOUS at 12:34

## 2018-03-24 RX ADMIN — METOPROLOL TARTRATE 50 MG: 50 TABLET ORAL at 05:49

## 2018-03-24 RX ADMIN — QUETIAPINE FUMARATE 25 MG: 25 TABLET ORAL at 08:49

## 2018-03-24 RX ADMIN — DONEPEZIL HYDROCHLORIDE 5 MG: 5 TABLET, FILM COATED ORAL at 08:49

## 2018-03-24 RX ADMIN — VANCOMYCIN HYDROCHLORIDE 125 MG: 1 INJECTION, POWDER, LYOPHILIZED, FOR SOLUTION INTRAVENOUS at 17:25

## 2018-03-24 RX ADMIN — FOLIC ACID 1 MG: 1 TABLET ORAL at 08:49

## 2018-03-24 RX ADMIN — DILTIAZEM HYDROCHLORIDE 60 MG: 30 TABLET, FILM COATED ORAL at 08:49

## 2018-03-24 RX ADMIN — DILTIAZEM HYDROCHLORIDE 60 MG: 30 TABLET, FILM COATED ORAL at 21:28

## 2018-03-24 RX ADMIN — NYSTATIN: 100000 POWDER TOPICAL at 21:00

## 2018-03-24 RX ADMIN — ONDANSETRON 4 MG: 4 TABLET, ORALLY DISINTEGRATING ORAL at 12:35

## 2018-03-24 RX ADMIN — VANCOMYCIN HYDROCHLORIDE 125 MG: 1 INJECTION, POWDER, LYOPHILIZED, FOR SOLUTION INTRAVENOUS at 05:49

## 2018-03-24 RX ADMIN — QUETIAPINE FUMARATE 12.5 MG: 25 TABLET ORAL at 21:28

## 2018-03-24 RX ADMIN — LEVOTHYROXINE SODIUM 125 MCG: 75 TABLET ORAL at 05:48

## 2018-03-24 RX ADMIN — METOPROLOL TARTRATE 50 MG: 50 TABLET ORAL at 21:28

## 2018-03-24 RX ADMIN — ACETAMINOPHEN 650 MG: 325 TABLET ORAL at 05:49

## 2018-03-24 RX ADMIN — Medication 1 CAPSULE: at 12:35

## 2018-03-24 RX ADMIN — OYSTER SHELL CALCIUM WITH VITAMIN D 1 TABLET: 500; 200 TABLET, FILM COATED ORAL at 08:49

## 2018-03-25 PROCEDURE — 74011250637 HC RX REV CODE- 250/637: Performed by: PHYSICAL MEDICINE & REHABILITATION

## 2018-03-25 RX ORDER — CEFDINIR 300 MG/1
300 CAPSULE ORAL 2 TIMES DAILY
Status: COMPLETED | OUTPATIENT
Start: 2018-03-25 | End: 2018-04-01

## 2018-03-25 RX ADMIN — DILTIAZEM HYDROCHLORIDE 60 MG: 30 TABLET, FILM COATED ORAL at 08:45

## 2018-03-25 RX ADMIN — Medication 1 CAPSULE: at 08:45

## 2018-03-25 RX ADMIN — FAMOTIDINE 20 MG: 20 TABLET, FILM COATED ORAL at 21:53

## 2018-03-25 RX ADMIN — DONEPEZIL HYDROCHLORIDE 5 MG: 5 TABLET, FILM COATED ORAL at 08:45

## 2018-03-25 RX ADMIN — VANCOMYCIN HYDROCHLORIDE 125 MG: 1 INJECTION, POWDER, LYOPHILIZED, FOR SOLUTION INTRAVENOUS at 12:37

## 2018-03-25 RX ADMIN — QUETIAPINE FUMARATE 25 MG: 25 TABLET ORAL at 08:45

## 2018-03-25 RX ADMIN — VANCOMYCIN HYDROCHLORIDE 125 MG: 1 INJECTION, POWDER, LYOPHILIZED, FOR SOLUTION INTRAVENOUS at 05:06

## 2018-03-25 RX ADMIN — Medication 1 CAPSULE: at 21:53

## 2018-03-25 RX ADMIN — VANCOMYCIN HYDROCHLORIDE 125 MG: 1 INJECTION, POWDER, LYOPHILIZED, FOR SOLUTION INTRAVENOUS at 17:31

## 2018-03-25 RX ADMIN — CEFDINIR 300 MG: 300 CAPSULE ORAL at 17:32

## 2018-03-25 RX ADMIN — SIMVASTATIN 40 MG: 20 TABLET, FILM COATED ORAL at 21:53

## 2018-03-25 RX ADMIN — QUETIAPINE FUMARATE 12.5 MG: 25 TABLET ORAL at 21:52

## 2018-03-25 RX ADMIN — METOPROLOL TARTRATE 50 MG: 50 TABLET ORAL at 05:07

## 2018-03-25 RX ADMIN — NYSTATIN: 100000 POWDER TOPICAL at 21:58

## 2018-03-25 RX ADMIN — OYSTER SHELL CALCIUM WITH VITAMIN D 1 TABLET: 500; 200 TABLET, FILM COATED ORAL at 08:45

## 2018-03-25 RX ADMIN — METOPROLOL TARTRATE 50 MG: 50 TABLET ORAL at 21:55

## 2018-03-25 RX ADMIN — RIVAROXABAN 20 MG: 10 TABLET, FILM COATED ORAL at 08:46

## 2018-03-25 RX ADMIN — THERA TABS 1 TABLET: TAB at 08:46

## 2018-03-25 RX ADMIN — LEVOTHYROXINE SODIUM 125 MCG: 75 TABLET ORAL at 05:07

## 2018-03-25 RX ADMIN — VANCOMYCIN HYDROCHLORIDE 125 MG: 1 INJECTION, POWDER, LYOPHILIZED, FOR SOLUTION INTRAVENOUS at 00:22

## 2018-03-25 RX ADMIN — METOPROLOL TARTRATE 50 MG: 50 TABLET ORAL at 13:49

## 2018-03-25 RX ADMIN — QUETIAPINE FUMARATE 25 MG: 25 TABLET ORAL at 21:57

## 2018-03-25 RX ADMIN — FAMOTIDINE 20 MG: 20 TABLET, FILM COATED ORAL at 08:45

## 2018-03-25 RX ADMIN — NYSTATIN: 100000 POWDER TOPICAL at 08:56

## 2018-03-25 RX ADMIN — DILTIAZEM HYDROCHLORIDE 60 MG: 30 TABLET, FILM COATED ORAL at 21:53

## 2018-03-25 RX ADMIN — DULOXETINE HYDROCHLORIDE 60 MG: 30 CAPSULE, DELAYED RELEASE ORAL at 08:46

## 2018-03-25 RX ADMIN — FOLIC ACID 1 MG: 1 TABLET ORAL at 08:46

## 2018-03-26 LAB
BACTERIA SPEC CULT: ABNORMAL
BACTERIA SPEC CULT: ABNORMAL
CC UR VC: ABNORMAL
CREAT SERPL-MCNC: 0.77 MG/DL (ref 0.55–1.02)
ERYTHROCYTE [DISTWIDTH] IN BLOOD BY AUTOMATED COUNT: 14.8 % (ref 11.5–14.5)
HCT VFR BLD AUTO: 29.3 % (ref 35–47)
HGB BLD-MCNC: 9 G/DL (ref 11.5–16)
MCH RBC QN AUTO: 31.6 PG (ref 26–34)
MCHC RBC AUTO-ENTMCNC: 30.7 G/DL (ref 30–36.5)
MCV RBC AUTO: 102.8 FL (ref 80–99)
NRBC # BLD: 0.04 K/UL (ref 0–0.01)
NRBC BLD-RTO: 0.5 PER 100 WBC
PLATELET # BLD AUTO: 307 K/UL (ref 150–400)
PMV BLD AUTO: 9.4 FL (ref 8.9–12.9)
RBC # BLD AUTO: 2.85 M/UL (ref 3.8–5.2)
SERVICE CMNT-IMP: ABNORMAL
WBC # BLD AUTO: 7.9 K/UL (ref 3.6–11)

## 2018-03-26 PROCEDURE — 85027 COMPLETE CBC AUTOMATED: CPT | Performed by: PHYSICAL MEDICINE & REHABILITATION

## 2018-03-26 PROCEDURE — 82565 ASSAY OF CREATININE: CPT | Performed by: PHYSICAL MEDICINE & REHABILITATION

## 2018-03-26 PROCEDURE — 36415 COLL VENOUS BLD VENIPUNCTURE: CPT | Performed by: PHYSICAL MEDICINE & REHABILITATION

## 2018-03-26 PROCEDURE — 74011250637 HC RX REV CODE- 250/637: Performed by: PHYSICAL MEDICINE & REHABILITATION

## 2018-03-26 RX ORDER — GUAIFENESIN 600 MG/1
600 TABLET, EXTENDED RELEASE ORAL EVERY 12 HOURS
Status: DISCONTINUED | OUTPATIENT
Start: 2018-03-26 | End: 2018-03-30

## 2018-03-26 RX ADMIN — QUETIAPINE FUMARATE 25 MG: 25 TABLET ORAL at 08:34

## 2018-03-26 RX ADMIN — GUAIFENESIN 600 MG: 600 TABLET, EXTENDED RELEASE ORAL at 11:48

## 2018-03-26 RX ADMIN — FAMOTIDINE 20 MG: 20 TABLET, FILM COATED ORAL at 08:34

## 2018-03-26 RX ADMIN — SIMVASTATIN 40 MG: 20 TABLET, FILM COATED ORAL at 21:37

## 2018-03-26 RX ADMIN — CEFDINIR 300 MG: 300 CAPSULE ORAL at 08:35

## 2018-03-26 RX ADMIN — Medication 1 CAPSULE: at 20:37

## 2018-03-26 RX ADMIN — NYSTATIN: 100000 POWDER TOPICAL at 20:35

## 2018-03-26 RX ADMIN — OYSTER SHELL CALCIUM WITH VITAMIN D 1 TABLET: 500; 200 TABLET, FILM COATED ORAL at 08:34

## 2018-03-26 RX ADMIN — METOPROLOL TARTRATE 50 MG: 50 TABLET ORAL at 14:14

## 2018-03-26 RX ADMIN — METOPROLOL TARTRATE 50 MG: 50 TABLET ORAL at 06:24

## 2018-03-26 RX ADMIN — FOLIC ACID 1 MG: 1 TABLET ORAL at 08:34

## 2018-03-26 RX ADMIN — METOPROLOL TARTRATE 50 MG: 50 TABLET ORAL at 21:38

## 2018-03-26 RX ADMIN — DULOXETINE HYDROCHLORIDE 60 MG: 30 CAPSULE, DELAYED RELEASE ORAL at 08:34

## 2018-03-26 RX ADMIN — LEVOTHYROXINE SODIUM 125 MCG: 75 TABLET ORAL at 06:24

## 2018-03-26 RX ADMIN — Medication 1 CAPSULE: at 08:34

## 2018-03-26 RX ADMIN — NYSTATIN: 100000 POWDER TOPICAL at 08:36

## 2018-03-26 RX ADMIN — QUETIAPINE FUMARATE 12.5 MG: 25 TABLET ORAL at 21:37

## 2018-03-26 RX ADMIN — VANCOMYCIN HYDROCHLORIDE 125 MG: 1 INJECTION, POWDER, LYOPHILIZED, FOR SOLUTION INTRAVENOUS at 11:52

## 2018-03-26 RX ADMIN — CEFDINIR 300 MG: 300 CAPSULE ORAL at 20:37

## 2018-03-26 RX ADMIN — DILTIAZEM HYDROCHLORIDE 60 MG: 30 TABLET, FILM COATED ORAL at 20:41

## 2018-03-26 RX ADMIN — VANCOMYCIN HYDROCHLORIDE 125 MG: 1 INJECTION, POWDER, LYOPHILIZED, FOR SOLUTION INTRAVENOUS at 06:23

## 2018-03-26 RX ADMIN — ACETAMINOPHEN 650 MG: 325 TABLET ORAL at 19:23

## 2018-03-26 RX ADMIN — VANCOMYCIN HYDROCHLORIDE 125 MG: 1 INJECTION, POWDER, LYOPHILIZED, FOR SOLUTION INTRAVENOUS at 00:42

## 2018-03-26 RX ADMIN — GUAIFENESIN 600 MG: 600 TABLET, EXTENDED RELEASE ORAL at 20:37

## 2018-03-26 RX ADMIN — DILTIAZEM HYDROCHLORIDE 60 MG: 30 TABLET, FILM COATED ORAL at 08:34

## 2018-03-26 RX ADMIN — THERA TABS 1 TABLET: TAB at 08:34

## 2018-03-26 RX ADMIN — DONEPEZIL HYDROCHLORIDE 5 MG: 5 TABLET, FILM COATED ORAL at 08:35

## 2018-03-26 RX ADMIN — VANCOMYCIN HYDROCHLORIDE 125 MG: 1 INJECTION, POWDER, LYOPHILIZED, FOR SOLUTION INTRAVENOUS at 17:09

## 2018-03-26 RX ADMIN — RIVAROXABAN 20 MG: 10 TABLET, FILM COATED ORAL at 08:34

## 2018-03-26 RX ADMIN — FAMOTIDINE 20 MG: 20 TABLET, FILM COATED ORAL at 20:37

## 2018-03-26 RX ADMIN — QUETIAPINE FUMARATE 25 MG: 25 TABLET ORAL at 20:37

## 2018-03-27 PROCEDURE — 74011250637 HC RX REV CODE- 250/637: Performed by: PHYSICAL MEDICINE & REHABILITATION

## 2018-03-27 RX ORDER — BENZONATATE 100 MG/1
100 CAPSULE ORAL
Status: DISCONTINUED | OUTPATIENT
Start: 2018-03-27 | End: 2018-04-04 | Stop reason: HOSPADM

## 2018-03-27 RX ADMIN — BENZONATATE 100 MG: 100 CAPSULE ORAL at 22:18

## 2018-03-27 RX ADMIN — RIVAROXABAN 20 MG: 10 TABLET, FILM COATED ORAL at 08:42

## 2018-03-27 RX ADMIN — NYSTATIN: 100000 POWDER TOPICAL at 21:59

## 2018-03-27 RX ADMIN — VANCOMYCIN HYDROCHLORIDE 125 MG: 1 INJECTION, POWDER, LYOPHILIZED, FOR SOLUTION INTRAVENOUS at 17:18

## 2018-03-27 RX ADMIN — METOPROLOL TARTRATE 50 MG: 50 TABLET ORAL at 05:35

## 2018-03-27 RX ADMIN — DILTIAZEM HYDROCHLORIDE 60 MG: 30 TABLET, FILM COATED ORAL at 21:53

## 2018-03-27 RX ADMIN — FAMOTIDINE 20 MG: 20 TABLET, FILM COATED ORAL at 08:40

## 2018-03-27 RX ADMIN — QUETIAPINE FUMARATE 12.5 MG: 25 TABLET ORAL at 21:54

## 2018-03-27 RX ADMIN — DULOXETINE HYDROCHLORIDE 60 MG: 30 CAPSULE, DELAYED RELEASE ORAL at 08:40

## 2018-03-27 RX ADMIN — DILTIAZEM HYDROCHLORIDE 60 MG: 30 TABLET, FILM COATED ORAL at 08:42

## 2018-03-27 RX ADMIN — QUETIAPINE FUMARATE 25 MG: 25 TABLET ORAL at 21:59

## 2018-03-27 RX ADMIN — FOLIC ACID 1 MG: 1 TABLET ORAL at 08:40

## 2018-03-27 RX ADMIN — VANCOMYCIN HYDROCHLORIDE 125 MG: 1 INJECTION, POWDER, LYOPHILIZED, FOR SOLUTION INTRAVENOUS at 23:21

## 2018-03-27 RX ADMIN — OYSTER SHELL CALCIUM WITH VITAMIN D 1 TABLET: 500; 200 TABLET, FILM COATED ORAL at 08:41

## 2018-03-27 RX ADMIN — QUETIAPINE FUMARATE 25 MG: 25 TABLET ORAL at 08:41

## 2018-03-27 RX ADMIN — LEVOTHYROXINE SODIUM 125 MCG: 75 TABLET ORAL at 05:35

## 2018-03-27 RX ADMIN — METOPROLOL TARTRATE 50 MG: 50 TABLET ORAL at 14:14

## 2018-03-27 RX ADMIN — VANCOMYCIN HYDROCHLORIDE 125 MG: 1 INJECTION, POWDER, LYOPHILIZED, FOR SOLUTION INTRAVENOUS at 00:20

## 2018-03-27 RX ADMIN — VANCOMYCIN HYDROCHLORIDE 125 MG: 1 INJECTION, POWDER, LYOPHILIZED, FOR SOLUTION INTRAVENOUS at 11:44

## 2018-03-27 RX ADMIN — ACETAMINOPHEN 650 MG: 325 TABLET ORAL at 08:39

## 2018-03-27 RX ADMIN — DONEPEZIL HYDROCHLORIDE 5 MG: 5 TABLET, FILM COATED ORAL at 08:40

## 2018-03-27 RX ADMIN — THERA TABS 1 TABLET: TAB at 08:41

## 2018-03-27 RX ADMIN — BENZONATATE 100 MG: 100 CAPSULE ORAL at 00:30

## 2018-03-27 RX ADMIN — CEFDINIR 300 MG: 300 CAPSULE ORAL at 21:53

## 2018-03-27 RX ADMIN — VANCOMYCIN HYDROCHLORIDE 125 MG: 1 INJECTION, POWDER, LYOPHILIZED, FOR SOLUTION INTRAVENOUS at 05:35

## 2018-03-27 RX ADMIN — SIMVASTATIN 40 MG: 20 TABLET, FILM COATED ORAL at 21:53

## 2018-03-27 RX ADMIN — BENZONATATE 100 MG: 100 CAPSULE ORAL at 08:39

## 2018-03-27 RX ADMIN — BENZONATATE 100 MG: 100 CAPSULE ORAL at 17:18

## 2018-03-27 RX ADMIN — METOPROLOL TARTRATE 50 MG: 50 TABLET ORAL at 21:54

## 2018-03-27 RX ADMIN — GUAIFENESIN 600 MG: 600 TABLET, EXTENDED RELEASE ORAL at 08:39

## 2018-03-27 RX ADMIN — Medication 1 CAPSULE: at 08:40

## 2018-03-27 RX ADMIN — FAMOTIDINE 20 MG: 20 TABLET, FILM COATED ORAL at 21:57

## 2018-03-27 RX ADMIN — GUAIFENESIN 600 MG: 600 TABLET, EXTENDED RELEASE ORAL at 21:54

## 2018-03-27 RX ADMIN — Medication 1 CAPSULE: at 21:53

## 2018-03-27 RX ADMIN — CEFDINIR 300 MG: 300 CAPSULE ORAL at 08:40

## 2018-03-27 RX ADMIN — NYSTATIN: 100000 POWDER TOPICAL at 08:42

## 2018-03-28 ENCOUNTER — HOSPITAL ENCOUNTER (OUTPATIENT)
Dept: GENERAL RADIOLOGY | Age: 83
Discharge: HOME | End: 2018-03-28
Attending: PHYSICAL MEDICINE & REHABILITATION
Payer: MEDICARE

## 2018-03-28 LAB
BASOPHILS # BLD: 0 K/UL (ref 0–0.1)
BASOPHILS NFR BLD: 0 % (ref 0–1)
DIFFERENTIAL METHOD BLD: ABNORMAL
EOSINOPHIL # BLD: 0.1 K/UL (ref 0–0.4)
EOSINOPHIL NFR BLD: 2 % (ref 0–7)
ERYTHROCYTE [DISTWIDTH] IN BLOOD BY AUTOMATED COUNT: 15.4 % (ref 11.5–14.5)
HCT VFR BLD AUTO: 30.3 % (ref 35–47)
HGB BLD-MCNC: 9.6 G/DL (ref 11.5–16)
IMM GRANULOCYTES # BLD: 0 K/UL (ref 0–0.04)
IMM GRANULOCYTES NFR BLD AUTO: 1 % (ref 0–0.5)
LYMPHOCYTES # BLD: 3 K/UL (ref 0.8–3.5)
LYMPHOCYTES NFR BLD: 48 % (ref 12–49)
MCH RBC QN AUTO: 32.1 PG (ref 26–34)
MCHC RBC AUTO-ENTMCNC: 31.7 G/DL (ref 30–36.5)
MCV RBC AUTO: 101.3 FL (ref 80–99)
MONOCYTES # BLD: 0.6 K/UL (ref 0–1)
MONOCYTES NFR BLD: 10 % (ref 5–13)
NEUTS SEG # BLD: 2.5 K/UL (ref 1.8–8)
NEUTS SEG NFR BLD: 40 % (ref 32–75)
NRBC # BLD: 0.02 K/UL (ref 0–0.01)
NRBC BLD-RTO: 0.3 PER 100 WBC
PLATELET # BLD AUTO: 277 K/UL (ref 150–400)
PMV BLD AUTO: 9.2 FL (ref 8.9–12.9)
RBC # BLD AUTO: 2.99 M/UL (ref 3.8–5.2)
WBC # BLD AUTO: 6.2 K/UL (ref 3.6–11)

## 2018-03-28 PROCEDURE — 74011250637 HC RX REV CODE- 250/637: Performed by: PHYSICAL MEDICINE & REHABILITATION

## 2018-03-28 PROCEDURE — 71046 X-RAY EXAM CHEST 2 VIEWS: CPT

## 2018-03-28 PROCEDURE — 36415 COLL VENOUS BLD VENIPUNCTURE: CPT | Performed by: PHYSICAL MEDICINE & REHABILITATION

## 2018-03-28 PROCEDURE — 85025 COMPLETE CBC W/AUTO DIFF WBC: CPT | Performed by: PHYSICAL MEDICINE & REHABILITATION

## 2018-03-28 RX ADMIN — GUAIFENESIN 600 MG: 600 TABLET, EXTENDED RELEASE ORAL at 22:00

## 2018-03-28 RX ADMIN — DILTIAZEM HYDROCHLORIDE 60 MG: 30 TABLET, FILM COATED ORAL at 22:00

## 2018-03-28 RX ADMIN — CEFDINIR 300 MG: 300 CAPSULE ORAL at 22:00

## 2018-03-28 RX ADMIN — GUAIFENESIN 600 MG: 600 TABLET, EXTENDED RELEASE ORAL at 09:35

## 2018-03-28 RX ADMIN — VANCOMYCIN HYDROCHLORIDE 125 MG: 1 INJECTION, POWDER, LYOPHILIZED, FOR SOLUTION INTRAVENOUS at 23:58

## 2018-03-28 RX ADMIN — VANCOMYCIN HYDROCHLORIDE 125 MG: 1 INJECTION, POWDER, LYOPHILIZED, FOR SOLUTION INTRAVENOUS at 12:36

## 2018-03-28 RX ADMIN — VANCOMYCIN HYDROCHLORIDE 125 MG: 1 INJECTION, POWDER, LYOPHILIZED, FOR SOLUTION INTRAVENOUS at 18:21

## 2018-03-28 RX ADMIN — QUETIAPINE FUMARATE 25 MG: 25 TABLET ORAL at 09:35

## 2018-03-28 RX ADMIN — BENZONATATE 100 MG: 100 CAPSULE ORAL at 22:00

## 2018-03-28 RX ADMIN — FAMOTIDINE 20 MG: 20 TABLET, FILM COATED ORAL at 22:01

## 2018-03-28 RX ADMIN — NYSTATIN: 100000 POWDER TOPICAL at 22:01

## 2018-03-28 RX ADMIN — DONEPEZIL HYDROCHLORIDE 5 MG: 5 TABLET, FILM COATED ORAL at 09:35

## 2018-03-28 RX ADMIN — DILTIAZEM HYDROCHLORIDE 60 MG: 30 TABLET, FILM COATED ORAL at 09:34

## 2018-03-28 RX ADMIN — FAMOTIDINE 20 MG: 20 TABLET, FILM COATED ORAL at 09:34

## 2018-03-28 RX ADMIN — METOPROLOL TARTRATE 50 MG: 50 TABLET ORAL at 22:00

## 2018-03-28 RX ADMIN — QUETIAPINE FUMARATE 25 MG: 25 TABLET ORAL at 22:00

## 2018-03-28 RX ADMIN — BENZONATATE 100 MG: 100 CAPSULE ORAL at 09:34

## 2018-03-28 RX ADMIN — OYSTER SHELL CALCIUM WITH VITAMIN D 1 TABLET: 500; 200 TABLET, FILM COATED ORAL at 09:35

## 2018-03-28 RX ADMIN — THERA TABS 1 TABLET: TAB at 09:35

## 2018-03-28 RX ADMIN — METOPROLOL TARTRATE 50 MG: 50 TABLET ORAL at 05:13

## 2018-03-28 RX ADMIN — RIVAROXABAN 20 MG: 10 TABLET, FILM COATED ORAL at 09:34

## 2018-03-28 RX ADMIN — DULOXETINE HYDROCHLORIDE 60 MG: 30 CAPSULE, DELAYED RELEASE ORAL at 09:34

## 2018-03-28 RX ADMIN — SIMVASTATIN 40 MG: 20 TABLET, FILM COATED ORAL at 22:00

## 2018-03-28 RX ADMIN — ACETAMINOPHEN 650 MG: 325 TABLET ORAL at 09:34

## 2018-03-28 RX ADMIN — Medication 1 CAPSULE: at 09:34

## 2018-03-28 RX ADMIN — FOLIC ACID 1 MG: 1 TABLET ORAL at 09:34

## 2018-03-28 RX ADMIN — ONDANSETRON 4 MG: 4 TABLET, ORALLY DISINTEGRATING ORAL at 09:34

## 2018-03-28 RX ADMIN — NYSTATIN: 100000 POWDER TOPICAL at 09:36

## 2018-03-28 RX ADMIN — CEFDINIR 300 MG: 300 CAPSULE ORAL at 09:34

## 2018-03-28 RX ADMIN — VANCOMYCIN HYDROCHLORIDE 125 MG: 1 INJECTION, POWDER, LYOPHILIZED, FOR SOLUTION INTRAVENOUS at 05:13

## 2018-03-28 RX ADMIN — Medication 1 CAPSULE: at 22:00

## 2018-03-28 RX ADMIN — LEVOTHYROXINE SODIUM 125 MCG: 75 TABLET ORAL at 05:35

## 2018-03-28 RX ADMIN — QUETIAPINE FUMARATE 12.5 MG: 25 TABLET ORAL at 22:02

## 2018-03-29 LAB
CREAT SERPL-MCNC: 0.81 MG/DL (ref 0.55–1.02)
ERYTHROCYTE [DISTWIDTH] IN BLOOD BY AUTOMATED COUNT: 15.2 % (ref 11.5–14.5)
HCT VFR BLD AUTO: 32.7 % (ref 35–47)
HGB BLD-MCNC: 10.2 G/DL (ref 11.5–16)
MCH RBC QN AUTO: 31.9 PG (ref 26–34)
MCHC RBC AUTO-ENTMCNC: 31.2 G/DL (ref 30–36.5)
MCV RBC AUTO: 102.2 FL (ref 80–99)
NRBC # BLD: 0 K/UL (ref 0–0.01)
NRBC BLD-RTO: 0 PER 100 WBC
PLATELET # BLD AUTO: 264 K/UL (ref 150–400)
PMV BLD AUTO: 9 FL (ref 8.9–12.9)
RBC # BLD AUTO: 3.2 M/UL (ref 3.8–5.2)
WBC # BLD AUTO: 6.9 K/UL (ref 3.6–11)

## 2018-03-29 PROCEDURE — 85027 COMPLETE CBC AUTOMATED: CPT | Performed by: PHYSICAL MEDICINE & REHABILITATION

## 2018-03-29 PROCEDURE — 82565 ASSAY OF CREATININE: CPT | Performed by: PHYSICAL MEDICINE & REHABILITATION

## 2018-03-29 PROCEDURE — 74011250637 HC RX REV CODE- 250/637: Performed by: PHYSICAL MEDICINE & REHABILITATION

## 2018-03-29 PROCEDURE — 36415 COLL VENOUS BLD VENIPUNCTURE: CPT | Performed by: PHYSICAL MEDICINE & REHABILITATION

## 2018-03-29 RX ORDER — FAMOTIDINE 20 MG/1
20 TABLET, FILM COATED ORAL DAILY
Status: DISCONTINUED | OUTPATIENT
Start: 2018-03-30 | End: 2018-04-04 | Stop reason: HOSPADM

## 2018-03-29 RX ADMIN — QUETIAPINE FUMARATE 12.5 MG: 25 TABLET ORAL at 21:48

## 2018-03-29 RX ADMIN — CEFDINIR 300 MG: 300 CAPSULE ORAL at 21:48

## 2018-03-29 RX ADMIN — DILTIAZEM HYDROCHLORIDE 60 MG: 30 TABLET, FILM COATED ORAL at 08:26

## 2018-03-29 RX ADMIN — CEFDINIR 300 MG: 300 CAPSULE ORAL at 08:27

## 2018-03-29 RX ADMIN — FAMOTIDINE 20 MG: 20 TABLET, FILM COATED ORAL at 08:27

## 2018-03-29 RX ADMIN — BENZONATATE 100 MG: 100 CAPSULE ORAL at 21:48

## 2018-03-29 RX ADMIN — FOLIC ACID 1 MG: 1 TABLET ORAL at 08:27

## 2018-03-29 RX ADMIN — Medication 1 CAPSULE: at 21:48

## 2018-03-29 RX ADMIN — RIVAROXABAN 20 MG: 10 TABLET, FILM COATED ORAL at 08:27

## 2018-03-29 RX ADMIN — QUETIAPINE FUMARATE 25 MG: 25 TABLET ORAL at 08:27

## 2018-03-29 RX ADMIN — OYSTER SHELL CALCIUM WITH VITAMIN D 1 TABLET: 500; 200 TABLET, FILM COATED ORAL at 08:26

## 2018-03-29 RX ADMIN — VANCOMYCIN HYDROCHLORIDE 125 MG: 1 INJECTION, POWDER, LYOPHILIZED, FOR SOLUTION INTRAVENOUS at 13:19

## 2018-03-29 RX ADMIN — VANCOMYCIN HYDROCHLORIDE 125 MG: 1 INJECTION, POWDER, LYOPHILIZED, FOR SOLUTION INTRAVENOUS at 17:39

## 2018-03-29 RX ADMIN — GUAIFENESIN 600 MG: 600 TABLET, EXTENDED RELEASE ORAL at 21:48

## 2018-03-29 RX ADMIN — DULOXETINE HYDROCHLORIDE 60 MG: 30 CAPSULE, DELAYED RELEASE ORAL at 08:26

## 2018-03-29 RX ADMIN — METOPROLOL TARTRATE 50 MG: 50 TABLET ORAL at 05:44

## 2018-03-29 RX ADMIN — DONEPEZIL HYDROCHLORIDE 5 MG: 5 TABLET, FILM COATED ORAL at 08:27

## 2018-03-29 RX ADMIN — DILTIAZEM HYDROCHLORIDE 60 MG: 30 TABLET, FILM COATED ORAL at 21:48

## 2018-03-29 RX ADMIN — GUAIFENESIN 600 MG: 600 TABLET, EXTENDED RELEASE ORAL at 08:27

## 2018-03-29 RX ADMIN — SIMVASTATIN 40 MG: 20 TABLET, FILM COATED ORAL at 21:48

## 2018-03-29 RX ADMIN — METOPROLOL TARTRATE 50 MG: 50 TABLET ORAL at 13:19

## 2018-03-29 RX ADMIN — QUETIAPINE FUMARATE 25 MG: 25 TABLET ORAL at 21:49

## 2018-03-29 RX ADMIN — Medication 1 CAPSULE: at 08:27

## 2018-03-29 RX ADMIN — METOPROLOL TARTRATE 50 MG: 50 TABLET ORAL at 21:48

## 2018-03-29 RX ADMIN — VANCOMYCIN HYDROCHLORIDE 125 MG: 1 INJECTION, POWDER, LYOPHILIZED, FOR SOLUTION INTRAVENOUS at 05:44

## 2018-03-29 RX ADMIN — LEVOTHYROXINE SODIUM 125 MCG: 75 TABLET ORAL at 05:44

## 2018-03-29 RX ADMIN — THERA TABS 1 TABLET: TAB at 08:26

## 2018-03-29 RX ADMIN — NYSTATIN: 100000 POWDER TOPICAL at 21:48

## 2018-03-29 RX ADMIN — BENZONATATE 100 MG: 100 CAPSULE ORAL at 13:19

## 2018-03-29 RX ADMIN — VANCOMYCIN HYDROCHLORIDE 125 MG: 1 INJECTION, POWDER, LYOPHILIZED, FOR SOLUTION INTRAVENOUS at 23:59

## 2018-03-29 RX ADMIN — NYSTATIN: 100000 POWDER TOPICAL at 08:25

## 2018-03-29 NOTE — PROGRESS NOTES
[facility] Dosing Services: 03/29/18  Xarelto dose change per renal P&T protocol  Physician: Dr Rebecca Judd  Indication: Hx of Afib    Previous Regimen Xarelto 20 mg po daily   Serum Creatinine Lab Results   Component Value Date/Time    Creatinine 0.81 03/29/2018 06:09 AM      Creatinine Clearance Estimated Creatinine Clearance: 45.8 mL/min (based on Cr of 0.81).    BUN Lab Results   Component Value Date/Time    BUN 7 03/24/2018 05:25 AM         Plan: Changed Xarelto to 15 mg po daily    Thank you  Orville Green, PharmD  [phone]

## 2018-03-29 NOTE — PROGRESS NOTES
[facility] Dosing Services: 03/29/18  Pepcid dose change per renal P&T protocol  Physician: Dr Wendy Fraga    Previous Regimen Pepcid 20 mg po BID   Serum Creatinine Lab Results   Component Value Date/Time    Creatinine 0.81 03/29/2018 06:09 AM      Creatinine Clearance Estimated Creatinine Clearance: 45.8 mL/min (based on Cr of 0.81).    BUN Lab Results   Component Value Date/Time    BUN 7 03/24/2018 05:25 AM         Plan: Changed Pepcid to 20 mg po daily    Thank you  Matthew Echavarria, PharmD  [phone]

## 2018-03-30 PROCEDURE — 74011250637 HC RX REV CODE- 250/637: Performed by: PHYSICAL MEDICINE & REHABILITATION

## 2018-03-30 RX ADMIN — CEFDINIR 300 MG: 300 CAPSULE ORAL at 09:20

## 2018-03-30 RX ADMIN — GUAIFENESIN 600 MG: 600 TABLET, EXTENDED RELEASE ORAL at 09:19

## 2018-03-30 RX ADMIN — ACETAMINOPHEN 650 MG: 325 TABLET ORAL at 09:17

## 2018-03-30 RX ADMIN — VANCOMYCIN HYDROCHLORIDE 125 MG: 1 INJECTION, POWDER, LYOPHILIZED, FOR SOLUTION INTRAVENOUS at 12:50

## 2018-03-30 RX ADMIN — DULOXETINE HYDROCHLORIDE 60 MG: 30 CAPSULE, DELAYED RELEASE ORAL at 09:19

## 2018-03-30 RX ADMIN — DILTIAZEM HYDROCHLORIDE 60 MG: 30 TABLET, FILM COATED ORAL at 21:51

## 2018-03-30 RX ADMIN — VANCOMYCIN HYDROCHLORIDE 125 MG: 1 INJECTION, POWDER, LYOPHILIZED, FOR SOLUTION INTRAVENOUS at 17:07

## 2018-03-30 RX ADMIN — METOPROLOL TARTRATE 50 MG: 50 TABLET ORAL at 06:04

## 2018-03-30 RX ADMIN — VANCOMYCIN HYDROCHLORIDE 125 MG: 1 INJECTION, POWDER, LYOPHILIZED, FOR SOLUTION INTRAVENOUS at 06:05

## 2018-03-30 RX ADMIN — SIMVASTATIN 40 MG: 20 TABLET, FILM COATED ORAL at 21:49

## 2018-03-30 RX ADMIN — NYSTATIN: 100000 POWDER TOPICAL at 09:19

## 2018-03-30 RX ADMIN — BENZONATATE 100 MG: 100 CAPSULE ORAL at 16:22

## 2018-03-30 RX ADMIN — FOLIC ACID 1 MG: 1 TABLET ORAL at 09:20

## 2018-03-30 RX ADMIN — Medication 1 CAPSULE: at 09:19

## 2018-03-30 RX ADMIN — RIVAROXABAN 15 MG: 10 TABLET, FILM COATED ORAL at 09:19

## 2018-03-30 RX ADMIN — QUETIAPINE FUMARATE 12.5 MG: 25 TABLET ORAL at 21:51

## 2018-03-30 RX ADMIN — CEFDINIR 300 MG: 300 CAPSULE ORAL at 21:51

## 2018-03-30 RX ADMIN — Medication 1 CAPSULE: at 21:51

## 2018-03-30 RX ADMIN — DONEPEZIL HYDROCHLORIDE 5 MG: 5 TABLET, FILM COATED ORAL at 09:20

## 2018-03-30 RX ADMIN — OYSTER SHELL CALCIUM WITH VITAMIN D 1 TABLET: 500; 200 TABLET, FILM COATED ORAL at 09:20

## 2018-03-30 RX ADMIN — FAMOTIDINE 20 MG: 20 TABLET ORAL at 09:25

## 2018-03-30 RX ADMIN — QUETIAPINE FUMARATE 25 MG: 25 TABLET ORAL at 21:50

## 2018-03-30 RX ADMIN — QUETIAPINE FUMARATE 25 MG: 25 TABLET ORAL at 09:19

## 2018-03-30 RX ADMIN — LEVOTHYROXINE SODIUM 125 MCG: 75 TABLET ORAL at 06:04

## 2018-03-30 RX ADMIN — METOPROLOL TARTRATE 50 MG: 50 TABLET ORAL at 21:52

## 2018-03-30 RX ADMIN — THERA TABS 1 TABLET: TAB at 09:19

## 2018-03-30 RX ADMIN — DILTIAZEM HYDROCHLORIDE 60 MG: 30 TABLET, FILM COATED ORAL at 09:20

## 2018-03-30 RX ADMIN — METOPROLOL TARTRATE 50 MG: 50 TABLET ORAL at 13:00

## 2018-03-30 RX ADMIN — NYSTATIN: 100000 POWDER TOPICAL at 21:00

## 2018-03-31 PROCEDURE — 74011250637 HC RX REV CODE- 250/637: Performed by: PHYSICAL MEDICINE & REHABILITATION

## 2018-03-31 RX ADMIN — METOPROLOL TARTRATE 50 MG: 50 TABLET ORAL at 14:26

## 2018-03-31 RX ADMIN — LEVOTHYROXINE SODIUM 125 MCG: 75 TABLET ORAL at 05:58

## 2018-03-31 RX ADMIN — QUETIAPINE FUMARATE 25 MG: 25 TABLET ORAL at 20:46

## 2018-03-31 RX ADMIN — METOPROLOL TARTRATE 50 MG: 50 TABLET ORAL at 05:59

## 2018-03-31 RX ADMIN — THERA TABS 1 TABLET: TAB at 09:08

## 2018-03-31 RX ADMIN — QUETIAPINE FUMARATE 25 MG: 25 TABLET ORAL at 09:08

## 2018-03-31 RX ADMIN — SIMVASTATIN 40 MG: 20 TABLET, FILM COATED ORAL at 20:46

## 2018-03-31 RX ADMIN — VANCOMYCIN HYDROCHLORIDE 125 MG: 1 INJECTION, POWDER, LYOPHILIZED, FOR SOLUTION INTRAVENOUS at 17:30

## 2018-03-31 RX ADMIN — CEFDINIR 300 MG: 300 CAPSULE ORAL at 20:43

## 2018-03-31 RX ADMIN — NYSTATIN: 100000 POWDER TOPICAL at 21:00

## 2018-03-31 RX ADMIN — QUETIAPINE FUMARATE 12.5 MG: 25 TABLET ORAL at 20:47

## 2018-03-31 RX ADMIN — FAMOTIDINE 20 MG: 20 TABLET ORAL at 09:08

## 2018-03-31 RX ADMIN — FOLIC ACID 1 MG: 1 TABLET ORAL at 09:08

## 2018-03-31 RX ADMIN — DILTIAZEM HYDROCHLORIDE 60 MG: 30 TABLET, FILM COATED ORAL at 20:44

## 2018-03-31 RX ADMIN — DILTIAZEM HYDROCHLORIDE 60 MG: 30 TABLET, FILM COATED ORAL at 09:08

## 2018-03-31 RX ADMIN — Medication 1 CAPSULE: at 09:08

## 2018-03-31 RX ADMIN — NYSTATIN: 100000 POWDER TOPICAL at 09:10

## 2018-03-31 RX ADMIN — DULOXETINE HYDROCHLORIDE 60 MG: 30 CAPSULE, DELAYED RELEASE ORAL at 09:08

## 2018-03-31 RX ADMIN — RIVAROXABAN 15 MG: 10 TABLET, FILM COATED ORAL at 09:07

## 2018-03-31 RX ADMIN — VANCOMYCIN HYDROCHLORIDE 125 MG: 1 INJECTION, POWDER, LYOPHILIZED, FOR SOLUTION INTRAVENOUS at 12:29

## 2018-03-31 RX ADMIN — DONEPEZIL HYDROCHLORIDE 5 MG: 5 TABLET, FILM COATED ORAL at 09:08

## 2018-03-31 RX ADMIN — Medication 1 CAPSULE: at 20:43

## 2018-03-31 RX ADMIN — CEFDINIR 300 MG: 300 CAPSULE ORAL at 09:08

## 2018-03-31 RX ADMIN — VANCOMYCIN HYDROCHLORIDE 125 MG: 1 INJECTION, POWDER, LYOPHILIZED, FOR SOLUTION INTRAVENOUS at 00:00

## 2018-03-31 RX ADMIN — OYSTER SHELL CALCIUM WITH VITAMIN D 1 TABLET: 500; 200 TABLET, FILM COATED ORAL at 09:08

## 2018-03-31 RX ADMIN — VANCOMYCIN HYDROCHLORIDE 125 MG: 1 INJECTION, POWDER, LYOPHILIZED, FOR SOLUTION INTRAVENOUS at 05:59

## 2018-03-31 RX ADMIN — METOPROLOL TARTRATE 50 MG: 50 TABLET ORAL at 20:45

## 2018-04-01 PROCEDURE — 74011250637 HC RX REV CODE- 250/637: Performed by: PHYSICAL MEDICINE & REHABILITATION

## 2018-04-01 RX ADMIN — Medication 1 CAPSULE: at 21:59

## 2018-04-01 RX ADMIN — DONEPEZIL HYDROCHLORIDE 5 MG: 5 TABLET, FILM COATED ORAL at 10:28

## 2018-04-01 RX ADMIN — ACETAMINOPHEN 650 MG: 325 TABLET ORAL at 00:26

## 2018-04-01 RX ADMIN — LEVOTHYROXINE SODIUM 125 MCG: 75 TABLET ORAL at 05:44

## 2018-04-01 RX ADMIN — DILTIAZEM HYDROCHLORIDE 60 MG: 30 TABLET, FILM COATED ORAL at 10:27

## 2018-04-01 RX ADMIN — Medication 1 CAPSULE: at 10:27

## 2018-04-01 RX ADMIN — METOPROLOL TARTRATE 50 MG: 50 TABLET ORAL at 17:50

## 2018-04-01 RX ADMIN — QUETIAPINE FUMARATE 25 MG: 25 TABLET ORAL at 21:59

## 2018-04-01 RX ADMIN — VANCOMYCIN HYDROCHLORIDE 125 MG: 1 INJECTION, POWDER, LYOPHILIZED, FOR SOLUTION INTRAVENOUS at 12:40

## 2018-04-01 RX ADMIN — SIMVASTATIN 40 MG: 20 TABLET, FILM COATED ORAL at 22:00

## 2018-04-01 RX ADMIN — METOPROLOL TARTRATE 50 MG: 50 TABLET ORAL at 05:44

## 2018-04-01 RX ADMIN — METOPROLOL TARTRATE 50 MG: 50 TABLET ORAL at 22:00

## 2018-04-01 RX ADMIN — BENZONATATE 100 MG: 100 CAPSULE ORAL at 00:26

## 2018-04-01 RX ADMIN — BENZONATATE 100 MG: 100 CAPSULE ORAL at 10:34

## 2018-04-01 RX ADMIN — NYSTATIN: 100000 POWDER TOPICAL at 21:59

## 2018-04-01 RX ADMIN — THERA TABS 1 TABLET: TAB at 10:30

## 2018-04-01 RX ADMIN — VANCOMYCIN HYDROCHLORIDE 125 MG: 1 INJECTION, POWDER, LYOPHILIZED, FOR SOLUTION INTRAVENOUS at 00:23

## 2018-04-01 RX ADMIN — VANCOMYCIN HYDROCHLORIDE 250 MG: 1 INJECTION, POWDER, LYOPHILIZED, FOR SOLUTION INTRAVENOUS at 17:50

## 2018-04-01 RX ADMIN — QUETIAPINE FUMARATE 12.5 MG: 25 TABLET ORAL at 22:00

## 2018-04-01 RX ADMIN — NYSTATIN: 100000 POWDER TOPICAL at 10:28

## 2018-04-01 RX ADMIN — RIVAROXABAN 15 MG: 10 TABLET, FILM COATED ORAL at 10:27

## 2018-04-01 RX ADMIN — OYSTER SHELL CALCIUM WITH VITAMIN D 1 TABLET: 500; 200 TABLET, FILM COATED ORAL at 10:28

## 2018-04-01 RX ADMIN — DULOXETINE HYDROCHLORIDE 60 MG: 30 CAPSULE, DELAYED RELEASE ORAL at 10:27

## 2018-04-01 RX ADMIN — CEFDINIR 300 MG: 300 CAPSULE ORAL at 10:27

## 2018-04-01 RX ADMIN — QUETIAPINE FUMARATE 25 MG: 25 TABLET ORAL at 10:28

## 2018-04-01 RX ADMIN — FAMOTIDINE 20 MG: 20 TABLET ORAL at 10:28

## 2018-04-01 RX ADMIN — VANCOMYCIN HYDROCHLORIDE 125 MG: 1 INJECTION, POWDER, LYOPHILIZED, FOR SOLUTION INTRAVENOUS at 05:43

## 2018-04-01 RX ADMIN — FOLIC ACID 1 MG: 1 TABLET ORAL at 10:28

## 2018-04-01 RX ADMIN — DILTIAZEM HYDROCHLORIDE 60 MG: 30 TABLET, FILM COATED ORAL at 22:00

## 2018-04-02 PROCEDURE — 74011250637 HC RX REV CODE- 250/637: Performed by: PHYSICAL MEDICINE & REHABILITATION

## 2018-04-02 RX ADMIN — QUETIAPINE FUMARATE 25 MG: 25 TABLET ORAL at 22:02

## 2018-04-02 RX ADMIN — DULOXETINE HYDROCHLORIDE 60 MG: 30 CAPSULE, DELAYED RELEASE ORAL at 09:06

## 2018-04-02 RX ADMIN — FAMOTIDINE 20 MG: 20 TABLET ORAL at 09:01

## 2018-04-02 RX ADMIN — LEVOTHYROXINE SODIUM 125 MCG: 75 TABLET ORAL at 05:53

## 2018-04-02 RX ADMIN — METOPROLOL TARTRATE 50 MG: 50 TABLET ORAL at 22:03

## 2018-04-02 RX ADMIN — NYSTATIN: 100000 POWDER TOPICAL at 09:10

## 2018-04-02 RX ADMIN — QUETIAPINE FUMARATE 25 MG: 25 TABLET ORAL at 09:06

## 2018-04-02 RX ADMIN — THERA TABS 1 TABLET: TAB at 09:05

## 2018-04-02 RX ADMIN — OYSTER SHELL CALCIUM WITH VITAMIN D 1 TABLET: 500; 200 TABLET, FILM COATED ORAL at 09:05

## 2018-04-02 RX ADMIN — QUETIAPINE FUMARATE 12.5 MG: 25 TABLET ORAL at 21:59

## 2018-04-02 RX ADMIN — METOPROLOL TARTRATE 50 MG: 50 TABLET ORAL at 14:40

## 2018-04-02 RX ADMIN — BENZONATATE 100 MG: 100 CAPSULE ORAL at 05:53

## 2018-04-02 RX ADMIN — VANCOMYCIN HYDROCHLORIDE 250 MG: 1 INJECTION, POWDER, LYOPHILIZED, FOR SOLUTION INTRAVENOUS at 17:35

## 2018-04-02 RX ADMIN — METOPROLOL TARTRATE 50 MG: 50 TABLET ORAL at 05:52

## 2018-04-02 RX ADMIN — VANCOMYCIN HYDROCHLORIDE 250 MG: 1 INJECTION, POWDER, LYOPHILIZED, FOR SOLUTION INTRAVENOUS at 05:52

## 2018-04-02 RX ADMIN — Medication 1 CAPSULE: at 09:01

## 2018-04-02 RX ADMIN — RIVAROXABAN 15 MG: 10 TABLET, FILM COATED ORAL at 09:05

## 2018-04-02 RX ADMIN — VANCOMYCIN HYDROCHLORIDE 250 MG: 1 INJECTION, POWDER, LYOPHILIZED, FOR SOLUTION INTRAVENOUS at 00:22

## 2018-04-02 RX ADMIN — DONEPEZIL HYDROCHLORIDE 5 MG: 5 TABLET, FILM COATED ORAL at 09:06

## 2018-04-02 RX ADMIN — DILTIAZEM HYDROCHLORIDE 60 MG: 30 TABLET, FILM COATED ORAL at 21:59

## 2018-04-02 RX ADMIN — Medication 1 CAPSULE: at 22:01

## 2018-04-02 RX ADMIN — VANCOMYCIN HYDROCHLORIDE 250 MG: 1 INJECTION, POWDER, LYOPHILIZED, FOR SOLUTION INTRAVENOUS at 12:42

## 2018-04-02 RX ADMIN — FOLIC ACID 1 MG: 1 TABLET ORAL at 09:06

## 2018-04-02 RX ADMIN — SIMVASTATIN 40 MG: 20 TABLET, FILM COATED ORAL at 22:02

## 2018-04-02 RX ADMIN — DILTIAZEM HYDROCHLORIDE 60 MG: 30 TABLET, FILM COATED ORAL at 09:01

## 2018-04-03 PROCEDURE — 74011250637 HC RX REV CODE- 250/637: Performed by: PHYSICAL MEDICINE & REHABILITATION

## 2018-04-03 RX ADMIN — DULOXETINE HYDROCHLORIDE 60 MG: 30 CAPSULE, DELAYED RELEASE ORAL at 09:05

## 2018-04-03 RX ADMIN — RIVAROXABAN 15 MG: 10 TABLET, FILM COATED ORAL at 09:05

## 2018-04-03 RX ADMIN — Medication 1 CAPSULE: at 22:07

## 2018-04-03 RX ADMIN — DILTIAZEM HYDROCHLORIDE 60 MG: 30 TABLET, FILM COATED ORAL at 09:06

## 2018-04-03 RX ADMIN — LEVOTHYROXINE SODIUM 125 MCG: 75 TABLET ORAL at 06:05

## 2018-04-03 RX ADMIN — METOPROLOL TARTRATE 50 MG: 50 TABLET ORAL at 22:07

## 2018-04-03 RX ADMIN — METOPROLOL TARTRATE 50 MG: 50 TABLET ORAL at 06:05

## 2018-04-03 RX ADMIN — NYSTATIN: 100000 POWDER TOPICAL at 09:07

## 2018-04-03 RX ADMIN — NYSTATIN: 100000 POWDER TOPICAL at 21:00

## 2018-04-03 RX ADMIN — Medication 1 CAPSULE: at 09:05

## 2018-04-03 RX ADMIN — VANCOMYCIN HYDROCHLORIDE 250 MG: 1 INJECTION, POWDER, LYOPHILIZED, FOR SOLUTION INTRAVENOUS at 06:05

## 2018-04-03 RX ADMIN — THERA TABS 1 TABLET: TAB at 09:06

## 2018-04-03 RX ADMIN — VANCOMYCIN HYDROCHLORIDE 250 MG: 1 INJECTION, POWDER, LYOPHILIZED, FOR SOLUTION INTRAVENOUS at 00:15

## 2018-04-03 RX ADMIN — OYSTER SHELL CALCIUM WITH VITAMIN D 1 TABLET: 500; 200 TABLET, FILM COATED ORAL at 09:06

## 2018-04-03 RX ADMIN — SIMVASTATIN 40 MG: 20 TABLET, FILM COATED ORAL at 22:07

## 2018-04-03 RX ADMIN — VANCOMYCIN HYDROCHLORIDE 250 MG: 1 INJECTION, POWDER, LYOPHILIZED, FOR SOLUTION INTRAVENOUS at 12:29

## 2018-04-03 RX ADMIN — DILTIAZEM HYDROCHLORIDE 60 MG: 30 TABLET, FILM COATED ORAL at 22:07

## 2018-04-03 RX ADMIN — QUETIAPINE FUMARATE 25 MG: 25 TABLET ORAL at 22:16

## 2018-04-03 RX ADMIN — DONEPEZIL HYDROCHLORIDE 5 MG: 5 TABLET, FILM COATED ORAL at 09:06

## 2018-04-03 RX ADMIN — FAMOTIDINE 20 MG: 20 TABLET ORAL at 09:05

## 2018-04-03 RX ADMIN — QUETIAPINE FUMARATE 12.5 MG: 25 TABLET ORAL at 22:07

## 2018-04-03 RX ADMIN — FOLIC ACID 1 MG: 1 TABLET ORAL at 09:05

## 2018-04-03 RX ADMIN — QUETIAPINE FUMARATE 25 MG: 25 TABLET ORAL at 09:06

## 2018-04-03 RX ADMIN — VANCOMYCIN HYDROCHLORIDE 250 MG: 1 INJECTION, POWDER, LYOPHILIZED, FOR SOLUTION INTRAVENOUS at 17:12

## 2018-04-04 VITALS
DIASTOLIC BLOOD PRESSURE: 72 MMHG | BODY MASS INDEX: 23.04 KG/M2 | WEIGHT: 130.06 LBS | HEART RATE: 70 BPM | SYSTOLIC BLOOD PRESSURE: 121 MMHG | HEIGHT: 63 IN

## 2018-04-04 PROCEDURE — 74011250637 HC RX REV CODE- 250/637: Performed by: PHYSICAL MEDICINE & REHABILITATION

## 2018-04-04 RX ADMIN — QUETIAPINE FUMARATE 25 MG: 25 TABLET ORAL at 09:05

## 2018-04-04 RX ADMIN — FAMOTIDINE 20 MG: 20 TABLET ORAL at 09:04

## 2018-04-04 RX ADMIN — VANCOMYCIN HYDROCHLORIDE 250 MG: 1 INJECTION, POWDER, LYOPHILIZED, FOR SOLUTION INTRAVENOUS at 00:22

## 2018-04-04 RX ADMIN — RIVAROXABAN 15 MG: 10 TABLET, FILM COATED ORAL at 09:05

## 2018-04-04 RX ADMIN — DILTIAZEM HYDROCHLORIDE 60 MG: 30 TABLET, FILM COATED ORAL at 09:04

## 2018-04-04 RX ADMIN — DONEPEZIL HYDROCHLORIDE 5 MG: 5 TABLET, FILM COATED ORAL at 09:05

## 2018-04-04 RX ADMIN — THERA TABS 1 TABLET: TAB at 09:05

## 2018-04-04 RX ADMIN — Medication 1 CAPSULE: at 09:04

## 2018-04-04 RX ADMIN — OYSTER SHELL CALCIUM WITH VITAMIN D 1 TABLET: 500; 200 TABLET, FILM COATED ORAL at 09:05

## 2018-04-04 RX ADMIN — LEVOTHYROXINE SODIUM 125 MCG: 75 TABLET ORAL at 05:48

## 2018-04-04 RX ADMIN — DULOXETINE HYDROCHLORIDE 60 MG: 30 CAPSULE, DELAYED RELEASE ORAL at 09:05

## 2018-04-04 RX ADMIN — VANCOMYCIN HYDROCHLORIDE 250 MG: 1 INJECTION, POWDER, LYOPHILIZED, FOR SOLUTION INTRAVENOUS at 05:49

## 2018-04-04 RX ADMIN — FOLIC ACID 1 MG: 1 TABLET ORAL at 09:05

## 2018-04-04 RX ADMIN — NYSTATIN: 100000 POWDER TOPICAL at 09:11

## 2019-05-14 LAB
CREATININE, EXTERNAL: 1.18
LDL-C, EXTERNAL: 70

## 2020-01-01 ENCOUNTER — TELEPHONE (OUTPATIENT)
Dept: FAMILY MEDICINE CLINIC | Age: 85
End: 2020-01-01

## 2020-01-01 ENCOUNTER — VIRTUAL VISIT (OUTPATIENT)
Dept: FAMILY MEDICINE CLINIC | Age: 85
End: 2020-01-01
Payer: MEDICARE

## 2020-01-01 VITALS
SYSTOLIC BLOOD PRESSURE: 122 MMHG | HEIGHT: 61 IN | BODY MASS INDEX: 25.49 KG/M2 | DIASTOLIC BLOOD PRESSURE: 80 MMHG | WEIGHT: 135 LBS

## 2020-01-01 DIAGNOSIS — R31.9 URINARY TRACT INFECTION WITH HEMATURIA, SITE UNSPECIFIED: Primary | ICD-10-CM

## 2020-01-01 DIAGNOSIS — N39.0 URINARY TRACT INFECTION WITH HEMATURIA, SITE UNSPECIFIED: Primary | ICD-10-CM

## 2020-01-01 DIAGNOSIS — E03.9 HYPOTHYROIDISM, UNSPECIFIED TYPE: Primary | ICD-10-CM

## 2020-01-01 DIAGNOSIS — C85.90 LYMPHOMA, UNSPECIFIED BODY REGION, UNSPECIFIED LYMPHOMA TYPE (HCC): ICD-10-CM

## 2020-01-01 LAB
BACTERIA UR CULT: ABNORMAL
BACTERIA UR CULT: ABNORMAL
BILIRUB UR QL STRIP: NEGATIVE
GLUCOSE UR-MCNC: NEGATIVE MG/DL
KETONES P FAST UR STRIP-MCNC: NEGATIVE MG/DL
PH UR STRIP: 7 [PH] (ref 4.6–8)
PROT UR QL STRIP: NEGATIVE
SP GR UR STRIP: 1.02 (ref 1–1.03)
UA UROBILINOGEN AMB POC: NORMAL (ref 0.2–1)
URINALYSIS CLARITY POC: NORMAL
URINALYSIS COLOR POC: YELLOW
URINE BLOOD POC: NORMAL
URINE LEUKOCYTES POC: NORMAL
URINE NITRITES POC: NEGATIVE

## 2020-01-01 PROCEDURE — 81003 URINALYSIS AUTO W/O SCOPE: CPT | Performed by: STUDENT IN AN ORGANIZED HEALTH CARE EDUCATION/TRAINING PROGRAM

## 2020-01-01 PROCEDURE — 99213 OFFICE O/P EST LOW 20 MIN: CPT | Performed by: STUDENT IN AN ORGANIZED HEALTH CARE EDUCATION/TRAINING PROGRAM

## 2020-01-01 RX ORDER — VERAPAMIL HYDROCHLORIDE 40 MG/1
TABLET ORAL
COMMUNITY
Start: 2020-07-08

## 2020-01-01 RX ORDER — ALBUTEROL SULFATE 90 UG/1
POWDER, METERED RESPIRATORY (INHALATION)
COMMUNITY
Start: 2020-07-08

## 2020-01-01 RX ORDER — FUROSEMIDE 20 MG/1
TABLET ORAL
COMMUNITY
Start: 2020-06-20

## 2020-01-01 RX ORDER — DILTIAZEM HYDROCHLORIDE 60 MG/1
60 TABLET, FILM COATED ORAL 3 TIMES DAILY
Qty: 90 TAB | Refills: 1 | Status: SHIPPED | OUTPATIENT
Start: 2020-01-01

## 2020-01-01 RX ORDER — LEVOTHYROXINE SODIUM 100 UG/1
100 TABLET ORAL DAILY
Qty: 90 TAB | Refills: 0 | Status: SHIPPED | OUTPATIENT
Start: 2020-01-01 | End: 2021-01-01

## 2020-01-01 RX ORDER — ATORVASTATIN CALCIUM 10 MG/1
TABLET, FILM COATED ORAL
Qty: 90 TAB | Refills: 0 | Status: SHIPPED | OUTPATIENT
Start: 2020-01-01

## 2020-01-01 RX ORDER — TRIMETHOPRIM 100 MG/1
1 TABLET ORAL DAILY
COMMUNITY
Start: 2020-01-01

## 2020-01-01 RX ORDER — MICONAZOLE NITRATE 2 %
1 CREAM WITH APPLICATOR VAGINAL DAILY
COMMUNITY

## 2020-01-01 RX ORDER — RIVAROXABAN 20 MG/1
1 TABLET, FILM COATED ORAL DAILY
COMMUNITY
Start: 2020-01-01

## 2020-01-01 RX ORDER — ATORVASTATIN CALCIUM 10 MG/1
10 TABLET, FILM COATED ORAL 2 TIMES DAILY
COMMUNITY

## 2020-01-01 RX ORDER — MIRTAZAPINE 15 MG/1
15 TABLET, FILM COATED ORAL
Qty: 90 TAB | Refills: 0 | Status: SHIPPED | OUTPATIENT
Start: 2020-01-01 | End: 2021-01-01

## 2020-01-01 RX ORDER — CHOLECALCIFEROL (VITAMIN D3) 125 MCG
5 CAPSULE ORAL
COMMUNITY

## 2020-01-01 RX ORDER — TRAMADOL HYDROCHLORIDE 50 MG/1
1 TABLET ORAL
COMMUNITY
Start: 2020-01-01

## 2020-01-01 RX ORDER — METOPROLOL TARTRATE 50 MG/1
50 TABLET ORAL 3 TIMES DAILY
COMMUNITY
Start: 2020-01-01

## 2020-01-01 RX ORDER — DULOXETIN HYDROCHLORIDE 60 MG/1
1 CAPSULE, DELAYED RELEASE ORAL DAILY
COMMUNITY
Start: 2020-01-01

## 2020-01-01 RX ORDER — MEMANTINE HYDROCHLORIDE 5 MG/1
1 TABLET ORAL 2 TIMES DAILY
COMMUNITY
Start: 2020-01-01

## 2020-01-01 RX ORDER — PANTOPRAZOLE SODIUM 40 MG/1
TABLET, DELAYED RELEASE ORAL
COMMUNITY
Start: 2020-07-25

## 2020-01-01 RX ORDER — CIPROFLOXACIN 250 MG/1
500 TABLET, FILM COATED ORAL 2 TIMES DAILY
Qty: 20 TAB | Refills: 0 | Status: SHIPPED | OUTPATIENT
Start: 2020-01-01 | End: 2020-01-01

## 2020-01-01 RX ORDER — SUCRALFATE 1 G/1
TABLET ORAL
COMMUNITY
Start: 2020-07-28

## 2020-01-01 RX ORDER — NITROFURANTOIN 25; 75 MG/1; MG/1
CAPSULE ORAL
COMMUNITY
Start: 2020-07-28

## 2020-01-01 RX ORDER — DONEPEZIL HYDROCHLORIDE 5 MG/1
1 TABLET, FILM COATED ORAL DAILY
COMMUNITY
Start: 2020-01-01

## 2020-01-01 RX ORDER — FOLIC ACID 1 MG/1
1 TABLET ORAL DAILY
COMMUNITY

## 2020-01-01 RX ORDER — LEVOTHYROXINE SODIUM 100 UG/1
100 TABLET ORAL DAILY
COMMUNITY
Start: 2020-01-01 | End: 2020-01-01 | Stop reason: SDUPTHER

## 2020-01-01 RX ORDER — MIRTAZAPINE 15 MG/1
15 TABLET, FILM COATED ORAL
COMMUNITY
Start: 2020-01-01 | End: 2020-01-01 | Stop reason: SDUPTHER

## 2020-01-01 RX ORDER — DILTIAZEM HYDROCHLORIDE 60 MG/1
1 TABLET, FILM COATED ORAL 3 TIMES DAILY
COMMUNITY
Start: 2020-01-01 | End: 2020-01-01 | Stop reason: SDUPTHER

## 2020-01-01 RX ORDER — BRAN/GUM/FIB/CEL/PSYL/KELP/PEC 1000 MG
1 TABLET ORAL DAILY
COMMUNITY

## 2020-01-01 RX ORDER — LEVMETAMFETAMINE 50 MG
1 INHALER (EA) NASAL DAILY
COMMUNITY

## 2020-07-27 ENCOUNTER — TELEPHONE (OUTPATIENT)
Dept: FAMILY MEDICINE CLINIC | Age: 85
End: 2020-07-27

## 2020-09-25 PROBLEM — E03.9 HYPOTHYROIDISM: Status: ACTIVE | Noted: 2020-01-01

## 2020-09-25 PROBLEM — R05.9 COUGH: Status: ACTIVE | Noted: 2020-01-01

## 2020-09-25 PROBLEM — M81.0 OSTEOPOROSIS, POSTMENOPAUSAL: Status: ACTIVE | Noted: 2020-01-01

## 2020-09-25 PROBLEM — R41.0 ACUTE CONFUSION: Status: ACTIVE | Noted: 2020-01-01

## 2020-09-25 PROBLEM — I10 HYPERTENSION: Status: ACTIVE | Noted: 2020-01-01

## 2020-09-25 PROBLEM — R53.1 ASTHENIA: Status: ACTIVE | Noted: 2020-01-01

## 2020-09-25 PROBLEM — J81.1 CHRONIC PULMONARY EDEMA: Status: ACTIVE | Noted: 2020-01-01

## 2020-09-25 PROBLEM — F03.90 DEMENTIA WITHOUT BEHAVIORAL DISTURBANCE (HCC): Status: ACTIVE | Noted: 2017-09-05

## 2020-09-25 PROBLEM — I48.91 ATRIAL FIBRILLATION (HCC): Status: ACTIVE | Noted: 2017-01-05

## 2020-09-25 PROBLEM — E87.70 HYPERVOLEMIA: Status: ACTIVE | Noted: 2020-01-01

## 2020-09-25 PROBLEM — Z93.3 COLOSTOMY STATUS (HCC): Status: ACTIVE | Noted: 2020-01-01

## 2020-09-25 PROBLEM — S72.143S: Status: ACTIVE | Noted: 2020-01-01

## 2020-09-25 PROBLEM — I48.20 CHRONIC ATRIAL FIBRILLATION (HCC): Status: ACTIVE | Noted: 2017-01-05

## 2020-09-25 PROBLEM — R41.82 ALTERED MENTAL STATUS: Status: ACTIVE | Noted: 2020-01-01

## 2020-09-25 PROBLEM — F32.9 MAJOR DEPRESSIVE DISORDER: Status: ACTIVE | Noted: 2020-01-01

## 2020-09-25 PROBLEM — M54.9 BACKACHE: Status: ACTIVE | Noted: 2020-01-01

## 2020-09-25 PROBLEM — R19.7 DIARRHEA: Status: ACTIVE | Noted: 2020-01-01

## 2020-09-25 PROBLEM — R09.02: Status: ACTIVE | Noted: 2020-01-01

## 2020-09-25 PROBLEM — Z87.898: Status: ACTIVE | Noted: 2020-01-01

## 2020-09-25 PROBLEM — E78.5 HYPERLIPEMIA: Status: ACTIVE | Noted: 2020-01-01

## 2020-09-25 PROBLEM — K21.9 GASTROESOPHAGEAL REFLUX DISEASE WITHOUT ESOPHAGITIS: Status: ACTIVE | Noted: 2020-06-10

## 2020-09-25 PROBLEM — M06.9 RA (RHEUMATOID ARTHRITIS) (HCC): Status: ACTIVE | Noted: 2020-01-01

## 2020-09-25 PROBLEM — M48.50XD: Status: ACTIVE | Noted: 2020-01-01

## 2020-09-25 PROBLEM — K43.9 HERNIA OF ANTERIOR ABDOMINAL WALL: Status: ACTIVE | Noted: 2020-01-01

## 2020-09-25 PROBLEM — R06.00 DYSPNEA: Status: ACTIVE | Noted: 2020-01-01

## 2020-09-25 PROBLEM — N39.0 UTI (URINARY TRACT INFECTION): Status: ACTIVE | Noted: 2020-01-01

## 2020-09-25 PROBLEM — C85.90 MALIGNANT LYMPHOMA (HCC): Status: ACTIVE | Noted: 2020-01-01

## 2020-09-25 PROBLEM — Z85.51 PERSONAL HISTORY OF MALIGNANT NEOPLASM OF BLADDER: Status: ACTIVE | Noted: 2017-01-05

## 2020-09-25 PROBLEM — I48.92 ATRIAL FLUTTER (HCC): Status: ACTIVE | Noted: 2020-01-01

## 2020-09-25 PROBLEM — D68.318 HEMORRHAGIC DISORDER DUE TO CIRCULATING ANTICOAGULANTS (HCC): Status: ACTIVE | Noted: 2020-01-01

## 2020-09-25 PROBLEM — A41.9 SEPSIS (HCC): Status: ACTIVE | Noted: 2020-01-01
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
## 2020-09-25 PROBLEM — K56.609 SMALL BOWEL OBSTRUCTION (HCC): Status: ACTIVE | Noted: 2020-01-01

## 2020-09-25 PROBLEM — I25.10 CORONARY ATHEROSCLEROSIS OF NATIVE CORONARY ARTERY: Status: ACTIVE | Noted: 2020-01-01

## 2020-09-25 PROBLEM — R11.2 NAUSEA AND VOMITING: Status: ACTIVE | Noted: 2020-01-01

## 2020-09-25 PROBLEM — R10.9 ABDOMINAL PAIN: Status: ACTIVE | Noted: 2020-01-01

## 2020-09-25 PROBLEM — I50.9 HEART FAILURE, UNSPECIFIED (HCC): Status: ACTIVE | Noted: 2020-01-01

## 2020-09-25 PROBLEM — E88.09 HYPOALBUMINEMIA: Status: ACTIVE | Noted: 2020-01-01

## 2020-09-25 PROBLEM — N60.91 ATYPICAL DUCTAL HYPERPLASIA OF RIGHT BREAST: Status: ACTIVE | Noted: 2017-01-05

## 2020-09-25 PROBLEM — S09.90XS: Status: ACTIVE | Noted: 2020-01-01

## 2020-09-25 PROBLEM — S22.31XA FRACTURE OF ONE RIB OF RIGHT SIDE: Status: ACTIVE | Noted: 2020-01-01

## 2020-09-25 PROBLEM — R60.9 PERIPHERAL EDEMA: Status: ACTIVE | Noted: 2020-01-01

## 2020-09-25 PROBLEM — K46.9 HERNIA OF ABDOMINAL CAVITY: Status: ACTIVE | Noted: 2020-01-01

## 2020-09-25 PROBLEM — D64.9 ANEMIA, UNSPECIFIED: Status: ACTIVE | Noted: 2020-01-01

## 2020-09-25 PROBLEM — D72.829 LEUKOCYTOSIS: Status: ACTIVE | Noted: 2020-01-01

## 2020-09-25 PROBLEM — Z86.718 PERSONAL HISTORY OF VENOUS THROMBOSIS AND EMBOLISM: Status: ACTIVE | Noted: 2020-01-01

## 2020-09-25 PROBLEM — E87.20 LACTIC ACIDOSIS: Status: ACTIVE | Noted: 2020-01-01

## 2020-09-25 NOTE — TELEPHONE ENCOUNTER
Samuel from [facility] would like a verbal order to start Physical therapy for Ms Alonso for 4 -6 weeks.

## 2020-09-25 NOTE — PROGRESS NOTES
Consent: Miguel Angel Ivan, who was seen by synchronous (real-time) audio-video technology, and/or her healthcare decision maker, is aware that this patient-initiated, Telehealth encounter on 9/25/2020 is a billable service, with coverage as determined by her insurance carrier. She is aware that she may receive a bill and has provided verbal consent to proceed: YES-Consent obtained within past 12 months [address] Subjective:  
Miguel Angel Ivan is a 82 y.o. female who was seen for UTI Spoke with patient's son, states that she has symptoms of UTI. Has been hospitalized in the past due to UTIs. Patient has history of multiple UTIs. She was previously on prophylactic nitrofurantoin, and Keflex and none of these worked for her. . She currently on prophylactic trimethoprim and previously was found to have, a UTI that was resistant to the Trimethoprin. She does have baseline dementia, but is acting more confused than usual, and this is common when she gets UTIs. She also complains of having pressure, and dysuria. UA was positive for leukocytes, urine was cloudy, notes 4+ blood. Of note she does use Xarelto for history of A. Fib, and denies any history of bleeding, or blood in stool. Prior to Admission medications Medication Sig Start Date End Date Taking? Authorizing Provider  
donepeziL (ARICEPT) 5 mg tablet Take 1 Tab by mouth daily. 9/15/20  Yes Provider, Historical  
DULoxetine (CYMBALTA) 60 mg capsule Take 1 Cap by mouth daily. 9/22/20  Yes Provider, Historical  
furosemide (LASIX) 20 mg tablet TAKE 1 TABLET BY MOUTH EVERY DAY 6/20/20  Yes Provider, Historical  
levothyroxine (SYNTHROID) 100 mcg tablet Take 100 mcg by mouth daily. 9/23/20  Yes Provider, Historical  
memantine (NAMENDA) 5 mg tablet Take 1 Tab by mouth two (2) times a day. 9/23/20  Yes Provider, Historical  
metoprolol tartrate (LOPRESSOR) 50 mg tablet Take 50 mg by mouth three (3) times daily.  9/22/20  Yes Provider, Historical  
 mirtazapine (REMERON) 15 mg tablet Take 15 mg by mouth nightly. 9/23/20  Yes Provider, Historical  
pantoprazole (PROTONIX) 40 mg tablet TAKE 1 TABLET BY MOUTH TWICE A DAY 15 30 MINUTES BEFORE EATING 7/25/20  Yes Provider, Historical  
Xarelto 20 mg tab tablet Take 1 Tab by mouth daily. 9/24/20  Yes Provider, Historical  
sucralfate (CARAFATE) 1 gram tablet TAKE 1 TABLET BEFORE MEALS AND AT BEDTIME 7/28/20  Yes Provider, Historical  
traMADoL (ULTRAM) 50 mg tablet Take 1 Tab by mouth every six (6) hours as needed. 9/22/20  Yes Provider, Historical  
trimethoprim (TRIMPEX) 100 mg tablet Take 1 Tab by mouth daily. 9/23/20  Yes Provider, Historical  
verapamiL (CALAN) 40 mg tablet TAKE 2 TABLETS BY MOUTH 3 TIMES A DAY 7/8/20  Yes Provider, Historical  
calcium citrate-vitamin D3 (Citracal + D) tablet Take 1 Tab by mouth daily. Yes Provider, Historical  
bran-gum-fib-harpreet-psyl-kelp-pec (Fiber 6) 1,000 mg tab Take 1 Tab by mouth daily. Yes Provider, Historical  
folic acid (FOLVITE) 1 mg tablet Take 1 mg by mouth daily. Yes Provider, Historical  
atorvastatin (LIPITOR) 10 mg tablet Take 10 mg by mouth two (2) times a day. Yes Provider, Historical  
cranberry fruit extract (cranberry extract) 500 mg tab Take 1 Cap by mouth daily. Yes Provider, Historical  
melatonin 5 mg tablet Take 5 mg by mouth nightly. Yes Provider, Historical  
ProAir RespiClick 90 mcg/actuation breath activated inhaler INHALE 2 PUFFS BY MOUTH AS NEEDED 7/8/20   Provider, Historical  
dilTIAZem IR (CARDIZEM) 60 mg tablet Take 1 Tab by mouth three (3) times daily. 9/22/20   Provider, Historical  
nitrofurantoin, macrocrystal-monohydrate, (MACROBID) 100 mg capsule TAKE 1 CAPSULE BY MOUTH TWICE A DAY 7/28/20   Provider, Historical  
 
Allergies Allergen Reactions  Morphine Unknown (comments) Patient Active Problem List  
 Diagnosis  Abdominal pain  Fall, accidental, sequela  Acute confusion  Altered mental status  Anemia, unspecified  History of adverse effect of anticoagulant medication  Asthenia  Atrial flutter ([address]  Backache  Closed injury of head, sequela  Displaced intertrochanteric fracture of unspecified femur, sequela  Collapsed vertebra, not elsewhere classified, site unspecified, subsequent encounter for fracture with routine healing  Heart failure, unspecified ([address]  Coronary atherosclerosis of native coronary artery  Cough  Major depressive disorder  Diarrhea  Hyperlipemia  Dyspnea  Fracture of one rib of right side  Hemorrhagic disorder due to circulating anticoagulants ([address]  Hernia of abdominal cavity  Hernia of anterior abdominal wall  Colostomy status ([address]  Personal history of venous thrombosis and embolism  Hypertension  Hypervolemia  Hypoalbuminemia  Hypothyroidism  Lactic acidosis  Leukocytosis  Malignant lymphoma ([address] In remission  Nausea and vomiting  Osteoporosis, postmenopausal  
 Oxygen supply absent  Peripheral edema  Chronic pulmonary edema  RA (rheumatoid arthritis) ([address]  Sepsis ([address]  Small bowel obstruction ([address]  UTI (urinary tract infection)  Gastroesophageal reflux disease without esophagitis  Dementia without behavioral disturbance ([facility]  Chronic atrial fibrillation ([address]  Atypical ductal hyperplasia of right breast  
 Personal history of malignant neoplasm of bladder Review of Systems Constitutional: Negative for fever. Objective:  
Vital Signs: (As obtained by patient/caregiver at home) There were no vitals taken for this visit. [INSTRUCTIONS:  \"[x]\" Indicates a positive item  \"[]\" Indicates a negative item  -- DELETE ALL ITEMS NOT EXAMINED] Constitutional: [x] Appears well-developed and well-nourished [x] No apparent distress   
  [] Abnormal -  
 
 Mental status: [x] Alert and awake  [] Oriented to person/place/time [] Able to follow commands   
[] Abnormal - Assessment & Plan:  
 
 
Urinary tract infection with hematuria, site unspecified -     UA suspicious for UTI. Will order Cipro, she has tolerated in the past, and she has history                             of drug-resistant UTIs. Can stop Trimethoprine while taking Cipro. Renally dosed Cipro   based on creatinine clearance of 39. Follow-up culture. Due to Xarelto, and blood in urine did advise to watch for bleeding. 
-     AMB POC URINALYSIS DIP STICK AUTO W/O MICRO 
-     ciprofloxacin HCl (CIPRO) 250 mg tablet; Take 2 Tabs by mouth two (2) times a day for 5 days. -     CULTURE, URINE Follow-up and Dispositions · Return if symptoms worsen or fail to improve. We discussed the expected course, resolution and complications of the diagnosis(es) in detail. Medication risks, benefits, costs, interactions, and alternatives were discussed as indicated. I advised her to contact the office if her condition worsens, changes or fails to improve as anticipated. She expressed understanding with the diagnosis(es) and plan. Rebecca Frances is a 82 y.o. female being evaluated by a video visit encounter for concerns as above. A caregiver was present when appropriate. Due to this being a TeleHealth encounter (During [facility]- public health emergency), evaluation of the following organ systems was limited: Vitals/Constitutional/EENT/Resp/CV/GI//MS/Neuro/Skin/Heme-Lymph-Imm. Pursuant to the emergency declaration under the [facility] waiver authority and the [employer] Act, this Virtual  Visit was conducted, with patient's (and/or legal guardian's) consent, to reduce the patient's risk of exposure to COVID-19 and provide necessary medical care. Services were provided through a video synchronous discussion virtually to substitute for in-person clinic visit. Patient and provider were located at their individual homes.  
 
 
 
Ayaan Gerard MD

## 2020-12-11 NOTE — TELEPHONE ENCOUNTER
At [facility] called to notify Pam Mitchell that the pt was re-evaluated for Occupational Therapy and they will be continuing.
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
## 2020-12-23 NOTE — TELEPHONE ENCOUNTER
Phone call from pt's son Anneliese Bal) stating that they are trying to get some [employer] aide and attendance benefits and medicaid but were told that they need a letter stating that the patient's son (Jane Adame. Alonso III) has been her primary care for over the past 3 years. Can we write letter or does pt need to have a visit?

## 2020-12-29 NOTE — TELEPHONE ENCOUNTER
Tanner Moya, Patient's son is calling here again today, he is in [place]. We arent sure what to do. Do you need to see this patient, or can you write a letter, can you help them?  Please advise

## 2020-12-29 NOTE — TELEPHONE ENCOUNTER
I've looked through all 3 chart systems and don't see where I've ever met this patient. She predominantly sees Patricia Parisi. If I'm missing something let me know otherwise, she should address.

## 2021-01-01 ENCOUNTER — TELEPHONE (OUTPATIENT)
Dept: FAMILY MEDICINE CLINIC | Age: 86
End: 2021-01-01

## 2021-01-01 DIAGNOSIS — E03.9 HYPOTHYROIDISM, UNSPECIFIED TYPE: ICD-10-CM

## 2021-01-01 RX ORDER — LEVOTHYROXINE SODIUM 100 UG/1
TABLET ORAL
Qty: 90 TAB | Refills: 1 | Status: SHIPPED | OUTPATIENT
Start: 2021-01-01

## 2021-01-01 RX ORDER — MIRTAZAPINE 15 MG/1
TABLET, FILM COATED ORAL
Qty: 90 TAB | Refills: 0 | Status: SHIPPED | OUTPATIENT
Start: 2021-01-01 | End: 2021-01-01

## 2021-01-01 RX ORDER — MIRTAZAPINE 15 MG/1
TABLET, FILM COATED ORAL
Qty: 90 TABLET | Refills: 0 | Status: SHIPPED | OUTPATIENT
Start: 2021-01-01

## 2021-01-18 NOTE — TELEPHONE ENCOUNTER
----- Message from Dario Caldera NP sent at 12/30/2020 12:36 PM EST -----  Regarding: letter stating that he is her CG  Call the son and find out exactly what the letter needs to say and I do have a question:  If he is in [place], how is he managing being her primary caregiver. To state that means that he is local and sees to her day to day needs.   This usually means bathing, dressing IADLs and usually does not pertain to just overseeing her financial issues

## 2021-06-18 NOTE — TELEPHONE ENCOUNTER
Her profile has that she is taking Remeron, cymbalta and celexa. I got a refill request on her Remeron and not sure if to refill it.   Also if she is on 3 meds, ask her if her psychiatrist did it